# Patient Record
Sex: MALE | Race: WHITE | Employment: OTHER | URBAN - METROPOLITAN AREA
[De-identification: names, ages, dates, MRNs, and addresses within clinical notes are randomized per-mention and may not be internally consistent; named-entity substitution may affect disease eponyms.]

---

## 2018-04-11 ENCOUNTER — HOSPITAL ENCOUNTER (OUTPATIENT)
Age: 58
Discharge: HOME | End: 2018-04-11
Payer: COMMERCIAL

## 2018-04-11 PROCEDURE — 84681 ASSAY OF C-PEPTIDE: CPT

## 2018-04-11 PROCEDURE — 83516 IMMUNOASSAY NONANTIBODY: CPT

## 2018-04-11 PROCEDURE — 86337 INSULIN ANTIBODIES: CPT

## 2018-04-14 LAB — C-PEPTIDE: 0.6 NG/ML (ref 0.8–3.5)

## 2018-04-15 LAB — GLUTAMIC ACID DECARB AB: >250 IU/ML (ref 0–5)

## 2018-04-17 LAB — INSULIN A: <0.4 U/ML (ref 0–0.4)

## 2018-05-04 ENCOUNTER — OFFICE VISIT (OUTPATIENT)
Dept: ENDOCRINOLOGY | Age: 58
End: 2018-05-04
Payer: COMMERCIAL

## 2018-05-04 VITALS
DIASTOLIC BLOOD PRESSURE: 76 MMHG | SYSTOLIC BLOOD PRESSURE: 146 MMHG | HEART RATE: 75 BPM | WEIGHT: 252.2 LBS | OXYGEN SATURATION: 100 % | BODY MASS INDEX: 36.11 KG/M2 | HEIGHT: 70 IN | TEMPERATURE: 97.7 F

## 2018-05-04 DIAGNOSIS — E10.9 TYPE 1 DIABETES MELLITUS WITHOUT COMPLICATION (HCC): Primary | ICD-10-CM

## 2018-05-04 PROCEDURE — 99214 OFFICE O/P EST MOD 30 MIN: CPT | Performed by: INTERNAL MEDICINE

## 2018-05-04 RX ORDER — LANCETS
EACH MISCELLANEOUS
COMMUNITY

## 2018-05-04 RX ORDER — BLOOD SUGAR DIAGNOSTIC
STRIP MISCELLANEOUS
Refills: 10 | COMMUNITY
Start: 2018-04-27 | End: 2019-01-30 | Stop reason: SDUPTHER

## 2018-05-04 RX ORDER — PEN NEEDLE, DIABETIC 32GX 5/32"
NEEDLE, DISPOSABLE MISCELLANEOUS
Refills: 3 | COMMUNITY
Start: 2018-04-21 | End: 2019-01-30 | Stop reason: SDUPTHER

## 2018-07-24 ENCOUNTER — OFFICE VISIT (OUTPATIENT)
Dept: ENDOCRINOLOGY | Age: 58
End: 2018-07-24
Payer: COMMERCIAL

## 2018-07-24 VITALS
DIASTOLIC BLOOD PRESSURE: 78 MMHG | SYSTOLIC BLOOD PRESSURE: 124 MMHG | BODY MASS INDEX: 34.25 KG/M2 | TEMPERATURE: 98.7 F | OXYGEN SATURATION: 99 % | HEIGHT: 70 IN | RESPIRATION RATE: 16 BRPM | WEIGHT: 239.25 LBS | HEART RATE: 82 BPM

## 2018-07-24 DIAGNOSIS — E10.9 TYPE 1 DIABETES MELLITUS WITHOUT COMPLICATION (HCC): Primary | ICD-10-CM

## 2018-07-24 LAB — HBA1C MFR BLD: 7 %

## 2018-07-24 PROCEDURE — 83036 HEMOGLOBIN GLYCOSYLATED A1C: CPT | Performed by: INTERNAL MEDICINE

## 2018-07-24 PROCEDURE — 99214 OFFICE O/P EST MOD 30 MIN: CPT | Performed by: INTERNAL MEDICINE

## 2018-07-24 RX ORDER — MELATONIN 10 MG
CAPSULE ORAL
COMMUNITY

## 2018-07-24 NOTE — PROGRESS NOTES
into the skin daily 48 UNITS - 52 UNITS       KRILL OIL PO Take 600 mg by mouth      TURMERIC PO Take 1,800 mg by mouth daily      APPLE CIDER VINEGAR PO Take by mouth daily      Boswellia Shaheen (BOSWELLIA PO) Take 480 mg by mouth daily      DEVILS CLAW PO Take 480 mg by mouth daily      MELATONIN PO Take 500 mcg by mouth daily 10 mg      Pramoxine-HC (HYDROCORTISONE-PRAMOXINE) 2.5-1 % CREA Apply topically      glimepiride (AMARYL) 4 MG tablet Take 1 tablet by mouth every morning 30 tablet 3    oxyCODONE-acetaminophen (PERCOCET) 5-325 MG per tablet Take 1 tablet by mouth every 8 hours as needed for Pain 12 tablet 0    sennosides-docusate sodium (SENOKOT-S) 8.6-50 MG tablet Take 1 tablet by mouth daily. 20 tablet 0    levothyroxine (SYNTHROID) 125 MCG tablet Take 125 mcg by mouth daily.  multivitamin (THERAGRAN) per tablet Take 1 tablet by mouth daily.  Ascorbic Acid (VITAMIN C) 1000 MG tablet Take 1,000 mg by mouth daily.  Cholecalciferol (VITAMIN D-3) 5000 UNITS TABS Take  by mouth.  Coenzyme Q10 (COQ-10) 400 MG CAPS Take  by mouth.  Psyllium (METAMUCIL PO) Take  by mouth.  atorvastatin (LIPITOR) 20 MG tablet Take 20 mg by mouth daily.  metFORMIN (GLUCOPHAGE) 500 MG tablet TAKE 2 TABLETS BY MOUTH TWICE DAILY WITH MEALS 120 tablet 0    Magnesium 500 MG TABS Take 400 mg by mouth daily       No current facility-administered medications on file prior to visit. ROS - Card: no CP,  Pulm: no SOB,  GI: + exacerbation of his anal fistula    PE -  Gen : /78 (Site: Right Arm, Position: Sitting, Cuff Size: Medium Adult)   Pulse 82   Temp 98.7 °F (37.1 °C) (Oral)   Resp 16   Ht 5' 10\" (1.778 m)   Wt 239 lb 4 oz (108.5 kg)   SpO2 99%   BMI 34.33 kg/m²    WN, WD, NAD  Lung: Nml resp effort  Psych: Normal mood   Full affect  Neur: Moves all extremities well      A/P -   1.  T1DM - controlled   A1c - 7.0% 7/24/18   Target a1c - 7.0-7.2%   Conts to follow his insulin regimen carefully and adhere to a diabetic diet   Will cont with the same plan:   Glargine 36 units and metformin 1000 mg bid   Eat 15 grams of carbs with 5 grams of protein at night on the days that you play golf or walk 2 miles   During the period of fistula inflammation/infection, average the fasting blood glucose over a three day period. If it is greater than 180, increase glargine by 2 units. Decrease back to 36 units daily once the infection has resolved   RTC in 3 months       2.   HTN - excellent   Cont with present anti-hypertensive regimen    Merlin Amasa  7/24/18

## 2018-09-19 ENCOUNTER — HOSPITAL ENCOUNTER (OUTPATIENT)
Age: 58
Discharge: HOME | End: 2018-09-21
Payer: COMMERCIAL

## 2018-09-19 PROCEDURE — 87088 URINE BACTERIA CULTURE: CPT

## 2018-09-19 PROCEDURE — 88112 CYTOPATH CELL ENHANCE TECH: CPT

## 2018-09-19 PROCEDURE — 87186 SC STD MICRODIL/AGAR DIL: CPT

## 2018-09-21 LAB
ORGANISM: ABNORMAL
URINE CULTURE, ROUTINE: ABNORMAL
URINE CULTURE, ROUTINE: ABNORMAL

## 2018-10-25 ENCOUNTER — OFFICE VISIT (OUTPATIENT)
Dept: ENDOCRINOLOGY | Age: 58
End: 2018-10-25
Payer: COMMERCIAL

## 2018-10-25 VITALS
OXYGEN SATURATION: 99 % | DIASTOLIC BLOOD PRESSURE: 90 MMHG | TEMPERATURE: 98.1 F | WEIGHT: 235 LBS | HEIGHT: 70 IN | HEART RATE: 75 BPM | BODY MASS INDEX: 33.64 KG/M2 | SYSTOLIC BLOOD PRESSURE: 150 MMHG

## 2018-10-25 DIAGNOSIS — E10.9 TYPE 1 DIABETES MELLITUS WITHOUT COMPLICATION (HCC): Primary | ICD-10-CM

## 2018-10-25 LAB — HBA1C MFR BLD: 7.4 %

## 2018-10-25 PROCEDURE — 99214 OFFICE O/P EST MOD 30 MIN: CPT | Performed by: INTERNAL MEDICINE

## 2018-10-25 PROCEDURE — 83036 HEMOGLOBIN GLYCOSYLATED A1C: CPT | Performed by: INTERNAL MEDICINE

## 2018-12-08 ENCOUNTER — TELEPHONE (OUTPATIENT)
Dept: ENDOCRINOLOGY | Age: 58
End: 2018-12-08

## 2018-12-08 DIAGNOSIS — Z79.4 TYPE 2 DIABETES MELLITUS WITH DIABETIC CATARACT, WITH LONG-TERM CURRENT USE OF INSULIN (HCC): Primary | ICD-10-CM

## 2018-12-08 DIAGNOSIS — E11.36 TYPE 2 DIABETES MELLITUS WITH DIABETIC CATARACT, WITH LONG-TERM CURRENT USE OF INSULIN (HCC): Primary | ICD-10-CM

## 2019-01-30 ENCOUNTER — OFFICE VISIT (OUTPATIENT)
Dept: ENDOCRINOLOGY | Age: 59
End: 2019-01-30
Payer: COMMERCIAL

## 2019-01-30 VITALS
WEIGHT: 233.6 LBS | HEART RATE: 66 BPM | HEIGHT: 70 IN | SYSTOLIC BLOOD PRESSURE: 136 MMHG | RESPIRATION RATE: 16 BRPM | DIASTOLIC BLOOD PRESSURE: 76 MMHG | OXYGEN SATURATION: 99 % | TEMPERATURE: 97.6 F | BODY MASS INDEX: 33.44 KG/M2

## 2019-01-30 DIAGNOSIS — I10 ESSENTIAL HYPERTENSION: ICD-10-CM

## 2019-01-30 DIAGNOSIS — E10.319 TYPE 1 DIABETES MELLITUS WITH RETINOPATHY, MACULAR EDEMA PRESENCE UNSPECIFIED, UNSPECIFIED LATERALITY, UNSPECIFIED RETINOPATHY SEVERITY (HCC): Primary | ICD-10-CM

## 2019-01-30 LAB — HBA1C MFR BLD: 7.8 %

## 2019-01-30 PROCEDURE — 99214 OFFICE O/P EST MOD 30 MIN: CPT | Performed by: INTERNAL MEDICINE

## 2019-01-30 PROCEDURE — 83036 HEMOGLOBIN GLYCOSYLATED A1C: CPT | Performed by: INTERNAL MEDICINE

## 2019-01-30 RX ORDER — BLOOD SUGAR DIAGNOSTIC
STRIP MISCELLANEOUS
Qty: 200 EACH | Refills: 3 | Status: SHIPPED
Start: 2019-01-30 | End: 2021-01-26 | Stop reason: SDUPTHER

## 2019-01-30 RX ORDER — VALSARTAN AND HYDROCHLOROTHIAZIDE 320; 25 MG/1; MG/1
1 TABLET, FILM COATED ORAL DAILY
Qty: 90 TABLET | Refills: 2 | Status: SHIPPED | OUTPATIENT
Start: 2019-01-30

## 2019-01-30 RX ORDER — OXYCODONE AND ACETAMINOPHEN 10; 325 MG/1; MG/1
1 TABLET ORAL EVERY 4 HOURS PRN
COMMUNITY

## 2019-01-30 RX ORDER — CA/D3/MAG OX/ZINC/COP/MANG/BOR 600 MG-800
TABLET,CHEWABLE ORAL
COMMUNITY

## 2019-01-30 RX ORDER — PEN NEEDLE, DIABETIC 32GX 5/32"
NEEDLE, DISPOSABLE MISCELLANEOUS
Qty: 100 EACH | Refills: 3 | Status: SHIPPED | OUTPATIENT
Start: 2019-01-30 | End: 2019-05-30 | Stop reason: SDUPTHER

## 2019-05-30 ENCOUNTER — OFFICE VISIT (OUTPATIENT)
Dept: ENDOCRINOLOGY | Age: 59
End: 2019-05-30
Payer: COMMERCIAL

## 2019-05-30 VITALS
HEIGHT: 70 IN | HEART RATE: 79 BPM | WEIGHT: 231.2 LBS | DIASTOLIC BLOOD PRESSURE: 80 MMHG | SYSTOLIC BLOOD PRESSURE: 130 MMHG | OXYGEN SATURATION: 99 % | BODY MASS INDEX: 33.1 KG/M2

## 2019-05-30 DIAGNOSIS — E03.8 HYPOTHYROIDISM DUE TO HASHIMOTO'S THYROIDITIS: ICD-10-CM

## 2019-05-30 DIAGNOSIS — I10 ESSENTIAL HYPERTENSION: ICD-10-CM

## 2019-05-30 DIAGNOSIS — E06.3 HYPOTHYROIDISM DUE TO HASHIMOTO'S THYROIDITIS: ICD-10-CM

## 2019-05-30 DIAGNOSIS — E10.319 TYPE 1 DIABETES MELLITUS WITH RETINOPATHY, MACULAR EDEMA PRESENCE UNSPECIFIED, UNSPECIFIED LATERALITY, UNSPECIFIED RETINOPATHY SEVERITY (HCC): Primary | ICD-10-CM

## 2019-05-30 LAB — HBA1C MFR BLD: 7.7 %

## 2019-05-30 PROCEDURE — 83036 HEMOGLOBIN GLYCOSYLATED A1C: CPT | Performed by: INTERNAL MEDICINE

## 2019-05-30 PROCEDURE — 99213 OFFICE O/P EST LOW 20 MIN: CPT | Performed by: INTERNAL MEDICINE

## 2019-05-30 RX ORDER — LIOTHYRONINE SODIUM 5 UG/1
TABLET ORAL
Qty: 180 TABLET | Refills: 1 | Status: SHIPPED | OUTPATIENT
Start: 2019-05-30 | End: 2019-11-15 | Stop reason: SDUPTHER

## 2019-05-30 RX ORDER — LEVOTHYROXINE SODIUM 125 MCG
125 TABLET ORAL DAILY
Qty: 90 TABLET | Refills: 3 | Status: SHIPPED
Start: 2019-05-30 | End: 2020-03-17 | Stop reason: SDUPTHER

## 2019-05-30 RX ORDER — LEVOTHYROXINE SODIUM 0.12 MG/1
125 TABLET ORAL DAILY
Qty: 90 TABLET | Refills: 3 | Status: SHIPPED | OUTPATIENT
Start: 2019-05-30 | End: 2019-05-30 | Stop reason: CLARIF

## 2019-05-30 NOTE — PROGRESS NOTES
CC - T2DM    HPI -       Pt returns for follow up of last visit with me on 1/30/2019. Instructions from last visit were:  1. T1DM - controlled   A1c -7.8% 1/30/19; (was 7.4% 10/25/18)   Target a1c - 7.0-7.2%   The rise in his a1c is most likely due to his having a URI 12/23/18 - 1/13/19 that eventually required an antibiotic   Conts to follow his insulin regimen carefully and adhere to a diabetic diet   Will cont with the same plan:   Glargine 32 units and metformin 1000 mg bid   Eat 15 grams of carbs with 5 grams of protein at night on the days when playing golf or walking 2 miles   See me back in four months     2. HTN - Controlled   Cont with present anti-hypertensive regimen. Update from today's encounter:  Weight  Date  235.0 lbs 10/25/18  233.6 lbs 01/30/19  231.2 lbs 05/30/19    A1c 7.8% 1/30/19 (was 7.4% 10/25/19)    BG log over the past two months reviewed. Readings fasting and pre-dinner are in the mid-100's for the most part. He does have some readings in the low 200's (maybe 8 out 30 days). Elevated pre-dinner readings are sometimes due to missing a morning insulin dose and taking it later in the day. Lows are infreq (<1 reading per month <60)  There is no pattern of BG readings. He is seeing a surgeon in [place] to address his fistula and rectal bleeding  Taking Glargine 30 units daily and metformin 1000 mg bid (he reduced his dose of glargine b/c of lows)    PFSH - no change    Meds -  Current Outpatient Medications on File Prior to Visit   Medication Sig Dispense Refill    oxyCODONE-acetaminophen (PERCOCET)  MG per tablet Take 1 tablet by mouth every 4 hours as needed for Pain. Iliana Roach Probiotic Product (PROBIOTIC ADVANCED) CAPS Take by mouth      metFORMIN (GLUCOPHAGE) 500 MG tablet TAKE 2 TABLETS BY MOUTH TWICE DAILY WITH MEALS 360 tablet 2    ACCU-CHEK MANDI PLUS strip Check BG up to six times daily 200 each 3    BD PEN NEEDLE LEONARDO U/F 32G X 4 MM MISC Use to inject 7. 0-7.2%   Glycemic control is adequate. His high readings are likely related to occasional mis-timing of his morning insulin doses. Therefore no adjustment is needed   Will cont with the same plan:   Glargine 30 units daily and metformin 1000 mg twice daily   Eat 15 grams of carbs with 5 grams of protein at night on the days when playing golf or walking 2 miles   Check Urine Albumin Creatinine Ratio    2. Hypothyroidism - status unknown   Check TSH    Cont with present thyroid hormone replacement therapy:  125mcg Synthroid daily and 5 mcg Liothyronine twice daily (was taking it as 10mcg once daily, but I prefer that he divide it into two doses)     3. HTN - Controlled   Cont with present anti-hypertensive regimen.      4.  Follow up    See me back in four months    Vannesa Brush MD  Endocrinology/Obesity  5/30/19

## 2019-05-30 NOTE — PATIENT INSTRUCTIONS
Glargine 30 units daily and metformin 1000 mg twice daily   Eat 15 grams of carbs with 5 grams of protein at night on the days when playing golf or walking 2 miles   Have a TSH and a Urine microalbumin checked   See me back in four months

## 2019-11-15 ENCOUNTER — OFFICE VISIT (OUTPATIENT)
Dept: ENDOCRINOLOGY | Age: 59
End: 2019-11-15
Payer: COMMERCIAL

## 2019-11-15 VITALS
OXYGEN SATURATION: 96 % | WEIGHT: 226.6 LBS | HEART RATE: 74 BPM | DIASTOLIC BLOOD PRESSURE: 86 MMHG | BODY MASS INDEX: 32.44 KG/M2 | SYSTOLIC BLOOD PRESSURE: 142 MMHG | RESPIRATION RATE: 16 BRPM | HEIGHT: 70 IN

## 2019-11-15 DIAGNOSIS — E03.9 HYPOTHYROIDISM (ACQUIRED): ICD-10-CM

## 2019-11-15 DIAGNOSIS — E10.319 TYPE 1 DIABETES MELLITUS WITH RETINOPATHY, MACULAR EDEMA PRESENCE UNSPECIFIED, UNSPECIFIED LATERALITY, UNSPECIFIED RETINOPATHY SEVERITY (HCC): Primary | ICD-10-CM

## 2019-11-15 LAB — HBA1C MFR BLD: 7 %

## 2019-11-15 PROCEDURE — 99213 OFFICE O/P EST LOW 20 MIN: CPT | Performed by: INTERNAL MEDICINE

## 2019-11-15 PROCEDURE — 83036 HEMOGLOBIN GLYCOSYLATED A1C: CPT | Performed by: INTERNAL MEDICINE

## 2019-11-15 RX ORDER — LIOTHYRONINE SODIUM 5 UG/1
TABLET ORAL
Qty: 180 TABLET | Refills: 2 | Status: SHIPPED
Start: 2019-11-15 | End: 2020-03-17 | Stop reason: SDUPTHER

## 2020-03-14 NOTE — PROGRESS NOTES
CC -   T1DM, Hypothyroidism, Graves' disease, Obesity    Background -   Pt returns for follow up of last visit with me on 11/15/2019    T1DM : Diagnosed 2000  (4/11/18 DANTE ab >250, C-peptide 0.6; however, he is controlled without prandial insulin)   First visit: 3/6/18  A1c: 7.7% 3/17/20, 7.0% 11/15/19, 7.0% 5/30/19,  7.8% 1/30/19, 7.4% 10/25/18  BG: fingersticks 2x/day, Both FBG and pre-D  mg/dl, 60% <150 mg/dl, Pre-D   Hypoglycemia: 1x/month, (45 mg/dl)  Regimen: Glargine 30 units daily, metformin 0576 mg bid  Complications: none  Eyes:      02/2020, no DR  Kidneys:  01/27/20 BUN/Cr 17/1.16, GFR >60  BP:          + HTN, 160/88, typically controlled with valsartan-HCTZ  Feet:     3/17/20Good monofilament sensation b/l (except slightly decreased over the left great toe due to a prior injury) no red spots, ulcers or calluses  Statin:    none     Ace-I:    Valsartan  DM Ed:   none  Diet:        Carb restricted, does not count    Graves' Disease: Diagnosed 1996  Treated successfully with MODI thyroid ablation, Takes LT4 125 mcg daily and T3 5mcg daily (states that he had a lot of fatigue until placed on the T3)    01/27/20  TSH 2.28 on LT4 125 mcg daily and Liothyronine 5 mcg bid  ______________________    [facility] -  Medical problems: T2DM, HTN, HLD, Graves' disease, Hypothyroidism, Anal fistulas, Back pain  Medications: Glargine, metformin, LT4, T3, Valsartan-HCTZ, Percocet,    ROS -  Card - no CP  GI - no N/V/D    PE -  Gen : BP (!) 160/88 (Site: Left Upper Arm, Position: Sitting, Cuff Size: Medium Adult)   Temp 97.9 °F (36.6 °C) (Oral)    WN, WD, NAD  Lung: Nml resp effort  Psych: Normal mood   Full affect  Neuro:  Moves all ext well  ______________________      HPI & A/P -   Problem 1  - T2DM  HPI   - Duration 20 yrs, Intensity is severe (insulin), Aggravated by high carb foods, Management is in the context of non-healing anal fistulas    See background above for summary of care    Surveillance and Preventive Care: up to date    Overall, he is doing a good job with his glycemic control  Assessment  - Controlled; cont with the current regimen  Plan   - Glargine 28 units daily and metformin 1000 mg twice daily   Eat 15 grams of carbs with 5 grams of protein at night on the days when playing golf or walking 2 miles   Get results of U ACR    Problem 2 - Hypothyroidism, post-ablative (Graves')  HPI  - No symptoms or hyper or hypothyroidism (no D/C, no palpitations, no sweating, no insomnia, no temp intolerance, no tremors)  Assessment - controlled, no change in his LT4 and T3 replacement therapy  Plan  - Cont with present thyroid hormone replacement therapy:  125mcg Synthroid daily and 5 mcg Liothyronine twice daily     Follow up  Return to see me in 4 months    I spent 25 minutes in a face to face encounter with Rachel Alejandre today. >15 minutes of this was in education and counseling regarding diabetes management, insulin therapy and thyroid hormone replacement therapy.     Flora Matias MD   [id number] [facility] Endocrinology & Obesity  3/14/20

## 2020-03-17 ENCOUNTER — OFFICE VISIT (OUTPATIENT)
Dept: ENDOCRINOLOGY | Age: 60
End: 2020-03-17
Payer: COMMERCIAL

## 2020-03-17 VITALS — TEMPERATURE: 97.9 F | DIASTOLIC BLOOD PRESSURE: 88 MMHG | SYSTOLIC BLOOD PRESSURE: 160 MMHG

## 2020-03-17 LAB — HBA1C MFR BLD: 7.7 %

## 2020-03-17 PROCEDURE — 3051F HG A1C>EQUAL 7.0%<8.0%: CPT | Performed by: INTERNAL MEDICINE

## 2020-03-17 PROCEDURE — 99214 OFFICE O/P EST MOD 30 MIN: CPT | Performed by: INTERNAL MEDICINE

## 2020-03-17 PROCEDURE — 83036 HEMOGLOBIN GLYCOSYLATED A1C: CPT | Performed by: INTERNAL MEDICINE

## 2020-03-17 RX ORDER — LEVOTHYROXINE SODIUM 125 MCG
125 TABLET ORAL DAILY
Qty: 90 TABLET | Refills: 3 | Status: SHIPPED
Start: 2020-03-17 | End: 2020-11-25 | Stop reason: ALTCHOICE

## 2020-03-17 RX ORDER — LIOTHYRONINE SODIUM 5 UG/1
TABLET ORAL
Qty: 180 TABLET | Refills: 3 | Status: SHIPPED
Start: 2020-03-17 | End: 2021-01-26 | Stop reason: DRUGHIGH

## 2020-07-21 ENCOUNTER — OFFICE VISIT (OUTPATIENT)
Dept: ENDOCRINOLOGY | Age: 60
End: 2020-07-21
Payer: COMMERCIAL

## 2020-07-21 LAB — HBA1C MFR BLD: 7.5 %

## 2020-07-21 PROCEDURE — 3051F HG A1C>EQUAL 7.0%<8.0%: CPT | Performed by: INTERNAL MEDICINE

## 2020-07-21 PROCEDURE — 99214 OFFICE O/P EST MOD 30 MIN: CPT | Performed by: INTERNAL MEDICINE

## 2020-07-21 PROCEDURE — 83036 HEMOGLOBIN GLYCOSYLATED A1C: CPT | Performed by: INTERNAL MEDICINE

## 2020-07-21 NOTE — PATIENT INSTRUCTIONS
Glargine 30 units daily and metformin 1000 mg twice daily   Eat 15 grams of carbs with 5 grams of protein at night on the days when playing golf or walking 2 miles. Take similar precautions when eating fish.    Cont with present thyroid hormone replacement therapy:  125mcg Synthroid daily and 5 mcg Liothyronine twice daily      See me back in six months

## 2020-07-21 NOTE — PROGRESS NOTES
CC -   T1DM, Hypothyroidism, Graves' disease, Obesity    Background -   Last visit:  3/17/20  Initial visit: 3/06/18    T1DM : Diagnosed 2000  (4/11/18 DANTE ab >250, C-peptide 0.6; however, he is controlled without prandial insulin)   A1c: 7.5% 7/21/20, 7.7% 3/17/20, 7.0% 11/15/19, 7.0% 5/30/19,  7.8% 1/30/19, 7.4% 10/25/18  BG: fingersticks 2x/day, Both FBG and pre-D low 100's  Hypoglycemia: 1-3x/month, (40,45, others in 60's over past 4 months), good awareness  Regimen: Glargine 30 units daily (he said 28 units was not enough; adjusts based on activity and food choices), metformin 2909 mg bid  Complications: none  Eyes:       02/2020, no DR  Kidneys:  01/27/20 BUN/Cr 17/1.16, GFR >60  BP:          + HTN, typically controlled with valsartan-HCTZ  Feet:     3/17/20 Good monofilament sensation b/l (except slightly decreased over the left great toe due to a prior injury) no red spots, ulcers or calluses  Statin:    none   (statin intolerant)  Ace-I:    Valsartan  DM Ed:   none  Diet:        Carb restricted, does not count    Graves' Disease: Diagnosed 1996  Treated successfully with MODI thyroid ablation, Takes LT4 125 mcg daily and T3 5mcg daily (states that he had a lot of fatigue until placed on the T3)    01/27/20  TSH 2.28 on LT4 125 mcg daily and Liothyronine 5 mcg bid  ______________________    [facility] -  Medical problems: T2DM, HTN, HLD, Graves' disease, Hypothyroidism, Anal fistulas, Back pain  Medications: Glargine, metformin, LT4, T3, Valsartan-HCTZ, Percocet    ROS -  Card - no CP  GI - no N/V/D    PE -  Gen : There were no vitals taken for this visit. WN, WD, NAD  Lung: Nml resp effort  Psych: Normal mood   Full affect  Neuro:  Moves all ext well  ______________________      HPI & A/P -   [id number]  - T2DM  HPI   - Duration 20 yrs, Intensity is severe (insulin), Aggravated by high carb foods, Management is in the context of non-healing anal fistulas    See background above for summary of care    Surveillance and Preventive Care: up to date (get results from UACR)    Having some lows, but they are explainable and avoidable by taking well timed snacks  Assessment  - Controlled; cont with the current regimen  Plan   - Glargine 30 units daily and metformin 1000 mg twice daily   Eat 15 grams of carbs with 5 grams of protein at night on the days when playing golf or walking 2 miles   Get results of U ACR    Problem 2 - Hypoglycemia  HPI  - 1-3x/month, (40,45, others in 60's over past 4 months), good awareness       States he knows what situations lead to the lows: increased activity, decreased food intake, and, oddly, eating fish       No need to adjust daily regimen for sparse number of events that can be prevented through snacking when at increased risk  Assessment - Uncontrolled, but stable  Plan  -   Eat 15 grams of carbs with 5 grams of protein at night on the days when playing golf or walking 2 miles. Take similar precautions when eating fish. Problem 3 - Hypothyroidism, post-ablative (Graves')  HPI  - No symptoms or hyper or hypothyroidism (no D/C, no palpitations, no sweating, no insomnia, no temp intolerance, no tremors)  Assessment - controlled, no change in his LT4 and T3 replacement therapy  Plan  -   Cont with present thyroid hormone replacement therapy:  125mcg Synthroid daily and 5 mcg Liothyronine twice daily     Follow up  Return to see me in 6 months    I spent 25 minutes in a face to face encounter with Derian Gordillo today. >15 minutes of this was in education and counseling regarding diabetes management, insulin therapy and thyroid hormone replacement therapy.     Gabino Lofton MD   [facility] Endocrinology & Obesity  7/21/20

## 2020-11-25 ENCOUNTER — TELEPHONE (OUTPATIENT)
Dept: ENDOCRINOLOGY | Age: 60
End: 2020-11-25

## 2020-11-25 RX ORDER — LEVOTHYROXINE SODIUM 125 UG/1
125 TABLET ORAL DAILY
Qty: 90 TABLET | Refills: 3 | Status: SHIPPED
Start: 2020-11-25 | End: 2021-05-25 | Stop reason: ALTCHOICE

## 2020-11-25 NOTE — TELEPHONE ENCOUNTER
Spoke with pt by phone  Insurance wont approve Synthroid  He has always taken this since being first prescribed LT4 25 yrs ago  Will switch to Euthyrox 125 mcg daily  SDR  11/25/20

## 2021-01-26 ENCOUNTER — OFFICE VISIT (OUTPATIENT)
Dept: ENDOCRINOLOGY | Age: 61
End: 2021-01-26
Payer: COMMERCIAL

## 2021-01-26 VITALS
OXYGEN SATURATION: 99 % | BODY MASS INDEX: 32.43 KG/M2 | TEMPERATURE: 97.3 F | HEART RATE: 74 BPM | DIASTOLIC BLOOD PRESSURE: 78 MMHG | SYSTOLIC BLOOD PRESSURE: 132 MMHG | WEIGHT: 226 LBS

## 2021-01-26 DIAGNOSIS — E10.319 TYPE 1 DIABETES MELLITUS WITH RETINOPATHY, MACULAR EDEMA PRESENCE UNSPECIFIED, UNSPECIFIED LATERALITY, UNSPECIFIED RETINOPATHY SEVERITY (HCC): Primary | ICD-10-CM

## 2021-01-26 DIAGNOSIS — E03.9 HYPOTHYROIDISM (ACQUIRED): ICD-10-CM

## 2021-01-26 LAB — HBA1C MFR BLD: 7.8 %

## 2021-01-26 PROCEDURE — 83036 HEMOGLOBIN GLYCOSYLATED A1C: CPT | Performed by: INTERNAL MEDICINE

## 2021-01-26 PROCEDURE — 99215 OFFICE O/P EST HI 40 MIN: CPT | Performed by: INTERNAL MEDICINE

## 2021-01-26 PROCEDURE — 3051F HG A1C>EQUAL 7.0%<8.0%: CPT | Performed by: INTERNAL MEDICINE

## 2021-01-26 RX ORDER — BLOOD SUGAR DIAGNOSTIC
STRIP MISCELLANEOUS
Qty: 300 EACH | Refills: 3 | Status: SHIPPED | OUTPATIENT
Start: 2021-01-26

## 2021-01-26 NOTE — PATIENT INSTRUCTIONS
Glargine 30 units daily and metformin 1000 mg twice daily   Eat 15 grams of carbs with 5 grams of protein at night on the days when playing golf or walking 2 miles. Take similar precautions when eating fish.    Cont Euthyrox 125 mcg daily   Stop LT3   Repeat TSH in 6-8 weeks   Follow up with Dr. Jesus Saint about skin lesion   Clarify with ophthalmology about whether you have retinopathy or not     See me back in six months

## 2021-01-26 NOTE — PROGRESS NOTES
CC -   T1DM, Hypothyroidism, Graves' disease    Background -   Last visit:  7/21/20  Initial visit: 3/06/18    · T1DM   Diagnosed 2000  (4/11/18 DANTE ab >250, C-peptide 0.6; however, he is controlled without prandial insulin)   A1c: 7.8% 1/26/21, 7.5% 7/21/20, 7.7% 3/17/20, 7.0% 11/15/19, 7.0% 5/30/19,  7.8% 1/30/19, 7.4% 10/25/18  BG: fingersticks 2x/day, Both FBG and pre-D low 100's with some around 200  Hypoglycemia: 1-3x/month, (lowest 57), good awareness  Regimen: Glargine 30 units daily , metformin 9305 mg bid  Complications: none (possibly DR)  Eyes:       12/09/20, unclear about DR (note says he does in the impression and he doesn't in the exam findings.    Kidneys:  07/31/20 BUN/Cr 25/1.39, GFR >52  UACR:     None in records  BP:          + HTN, 132/78, typically controlled with valsartan-HCTZ  Lipids:    7/31/20 /64/49 TG  Feet:    3/17/20 Good monofilament sensation b/l (except slightly decreased over the left great toe due to a prior injury) no red spots, ulcers or calluses  Statin:    none   (statin intolerant)  Ace-I:    Valsartan  DM Ed:   none  Diet:        Carb restricted, does not count    · Graves' Disease  Diagnosed 1996  Treated successfully with MODI thyroid ablation  Had a lot of fatigue until placed on the T3  Switched from Synthroid to Euthyrox 11/25/20 due to insurance changes    01/27/20  TSH 2.28 on LT4 125 mcg daily and Liothyronine 5 mcg bid                      Reduced his dose of LT3 to 5 mcg daily b/c he didn't \"feel right\"  07/31/20  TSH 1.34 on LT4 125 mcg daily and Liothyronine 5 mcg daily    ______________________    [facility] -  Medical problems: T1DM, HTN, HLD, Graves' disease, Hypothyroidism, Anal fistulas, Back pain  Current Outpatient Medications   Medication Sig Dispense Refill    EUTHYROX 125 MCG tablet Take 1 tablet by mouth Daily 90 tablet 3    metFORMIN (GLUCOPHAGE) 500 MG tablet TAKE 2 TABLETS BY MOUTH TWICE DAILY WITH MEALS 360 tablet 3  insulin glargine (LANTUS;BASAGLAR) 100 UNIT/ML injection pen Inject 30 Units into the skin daily (Patient taking differently: Inject 30 Units into the skin daily 29 units in am) 5 pen 5    liothyronine (CYTOMEL) 5 MCG tablet Taking one tablet daily 180 tablet 3    Insulin Pen Needle 32G X 4 MM MISC 1 each by Does not apply route daily 100 each 3    oxyCODONE-acetaminophen (PERCOCET)  MG per tablet Take 1 tablet by mouth every 4 hours as needed for Pain. Charissa Romero Probiotic Product (PROBIOTIC ADVANCED) CAPS Take by mouth      ACCU-CHEK MANDI PLUS strip Check BG up to six times daily 200 each 3    valsartan-hydrochlorothiazide (DIOVAN-HCT) 320-25 MG per tablet Take 1 tablet by mouth daily 90 tablet 2    Melatonin 10 MG CAPS Take by mouth      glucose blood VI test strips (EXACTECH TEST) strip by Other route      ACCU-CHEK FASTCLIX LANCETS MISC by Other route      bimatoprost (LUMIGAN) 0.01 % SOLN ophthalmic drops 1 Drop daily at bedtime.  KRILL OIL PO Take 600 mg by mouth      TURMERIC PO Take 1,800 mg by mouth daily      APPLE CIDER VINEGAR PO Take by mouth daily      Pramoxine-HC (HYDROCORTISONE-PRAMOXINE) 2.5-1 % CREA Apply topically      multivitamin (THERAGRAN) per tablet Take 1 tablet by mouth daily.  Ascorbic Acid (VITAMIN C) 1000 MG tablet Take 1,000 mg by mouth daily.  Cholecalciferol (VITAMIN D-3) 5000 UNITS TABS Take  by mouth.  Coenzyme Q10 (COQ-10) 400 MG CAPS Take  by mouth.  Psyllium (METAMUCIL PO) Take  by mouth. No current facility-administered medications for this visit. ROS -  Card - no CP  GI - no N/V/D    PE -  Gen : /78   Pulse 74   Temp 97.3 °F (36.3 °C)   Wt 226 lb (102.5 kg)   SpO2 99%   BMI 32.43 kg/m²    WN, WD, NAD  Lung: Nml resp effort  Psych: Normal mood   Full affect  Neuro:  Moves all ext well  ______________________      HPI & A/P -   Problem 1  - T1DM  HPI   - See background above for summary of care Surveillance and Preventive Care: need UACR    Having some lows, but they are explainable and avoidable by taking well timed snacks  Assessment  - Controlled; cont with the current regimen  Plan   - Glargine 30 units daily and metformin 1000 mg twice daily   Eat 15 grams of carbs with 5 grams of protein at night on the days when playing golf or walking 2 miles   Get results of U ACR    Problem 2 - Hypothyroidism, post-ablative (Graves')   HPI  - No symptoms or hyper or hypothyroidism (no D/C, no palpitations, no sweating, no insomnia, no temp intolerance, no tremors)      Switched from Synthroid to Euthyrox on 11/25/20 b/c of insurance change      Reduced his dose of LT3 b/c of not feeling well and plans now on stopping it all together  Assessment - controlled per symptoms; need to repeat TSH after changes to LT3  Plan  -   Cont Euthyrox 125 mcg daily  Stop LT3  Repeat TSH in 6-8 weeks    Problem 3 - skin site at previous insulin injection site  HPI  - Happened on 1/20/21      When giving the injection it didn't feel right to him      On exam, it appears to be an area that is fluctuant. It is discolored reddish/purple in area of fluctuance. No tenderness. No warmth.  No surrounding errythema      May need to have it lanced      Will be seeing PCP tomorrow and address it with him  Assessment - Uncontrolled  Plan  - follow up with PCP      Follow up  Return to see me in 6 months    Total time spent on encounter: 40 min    Jaky Silveira MD   [facility] Endocrinology & Obesity  1/26/21

## 2021-05-25 DIAGNOSIS — E03.9 HYPOTHYROIDISM (ACQUIRED): ICD-10-CM

## 2021-05-25 DIAGNOSIS — E10.319 TYPE 1 DIABETES MELLITUS WITH RETINOPATHY, MACULAR EDEMA PRESENCE UNSPECIFIED, UNSPECIFIED LATERALITY, UNSPECIFIED RETINOPATHY SEVERITY (HCC): Primary | ICD-10-CM

## 2021-05-25 RX ORDER — LEVOTHYROXINE SODIUM 0.12 MG/1
125 TABLET ORAL DAILY
Qty: 90 TABLET | Refills: 3 | Status: SHIPPED | OUTPATIENT
Start: 2021-05-25

## 2021-05-25 RX ORDER — LEVOTHYROXINE SODIUM 0.12 MG/1
125 TABLET ORAL DAILY
Qty: 90 TABLET | Refills: 3 | Status: SHIPPED
Start: 2021-05-25 | End: 2021-05-25

## 2023-09-26 PROBLEM — N99.89: Status: ACTIVE | Noted: 2023-09-26

## 2023-09-26 PROBLEM — K62.89 RECTAL PAIN: Status: ACTIVE | Noted: 2023-09-26

## 2023-09-26 PROBLEM — K60.3 PERIANAL FISTULA: Status: ACTIVE | Noted: 2023-09-26

## 2023-09-26 RX ORDER — PROPRANOLOL/HYDROCHLOROTHIAZID 40 MG-25MG
TABLET ORAL
COMMUNITY
End: 2023-10-17

## 2023-09-26 RX ORDER — MULTIVITAMIN
TABLET ORAL
COMMUNITY

## 2023-09-26 RX ORDER — CHOLECALCIFEROL (VITAMIN D3) 125 MCG
CAPSULE ORAL
COMMUNITY

## 2023-09-26 RX ORDER — METFORMIN HYDROCHLORIDE 500 MG/1
TABLET, EXTENDED RELEASE ORAL
COMMUNITY

## 2023-09-26 RX ORDER — MV/FA/DHA/EPA/FISH OIL/SAW/GNK 400MCG-200
COMBINATION PACKAGE (EA) ORAL
COMMUNITY

## 2023-09-26 RX ORDER — BIMATOPROST 0.1 MG/ML
SOLUTION/ DROPS OPHTHALMIC
COMMUNITY

## 2023-09-26 RX ORDER — VALSARTAN AND HYDROCHLOROTHIAZIDE 320; 25 MG/1; MG/1
TABLET, FILM COATED ORAL
COMMUNITY

## 2023-09-26 RX ORDER — OXYCODONE AND ACETAMINOPHEN 10; 325 MG/1; MG/1
TABLET ORAL
COMMUNITY

## 2023-09-26 RX ORDER — INSULIN GLARGINE 100 [IU]/ML
INJECTION, SOLUTION SUBCUTANEOUS
COMMUNITY

## 2023-09-26 RX ORDER — LEVOTHYROXINE SODIUM 125 UG/1
TABLET ORAL
COMMUNITY

## 2023-09-26 RX ORDER — IBUPROFEN 100 MG/5ML
SUSPENSION, ORAL (FINAL DOSE FORM) ORAL
COMMUNITY

## 2023-09-26 RX ORDER — LIOTHYRONINE SODIUM 5 UG/1
TABLET ORAL
COMMUNITY
End: 2023-10-17

## 2023-09-26 RX ORDER — PSYLLIUM SEED (WITH DEXTROSE)
POWDER (GRAM) ORAL
COMMUNITY

## 2023-10-02 ENCOUNTER — PREP FOR PROCEDURE (OUTPATIENT)
Dept: SURGERY | Facility: HOSPITAL | Age: 63
End: 2023-10-02

## 2023-10-02 ENCOUNTER — OFFICE VISIT (OUTPATIENT)
Dept: SURGERY | Facility: CLINIC | Age: 63
End: 2023-10-02
Payer: COMMERCIAL

## 2023-10-02 VITALS
HEIGHT: 70 IN | HEART RATE: 75 BPM | SYSTOLIC BLOOD PRESSURE: 156 MMHG | BODY MASS INDEX: 33.64 KG/M2 | DIASTOLIC BLOOD PRESSURE: 95 MMHG | WEIGHT: 235 LBS

## 2023-10-02 DIAGNOSIS — K60.3 PERIANAL FISTULA: Primary | ICD-10-CM

## 2023-10-02 DIAGNOSIS — E11.9 TYPE 2 DIABETES MELLITUS WITHOUT COMPLICATION, WITH LONG-TERM CURRENT USE OF INSULIN (MULTI): ICD-10-CM

## 2023-10-02 DIAGNOSIS — K62.89 RECTAL PAIN: ICD-10-CM

## 2023-10-02 DIAGNOSIS — Z79.4 TYPE 2 DIABETES MELLITUS WITHOUT COMPLICATION, WITH LONG-TERM CURRENT USE OF INSULIN (MULTI): ICD-10-CM

## 2023-10-02 DIAGNOSIS — K60.3 ANAL FISTULA: Primary | ICD-10-CM

## 2023-10-02 DIAGNOSIS — K60.3 ANAL FISTULA: ICD-10-CM

## 2023-10-02 DIAGNOSIS — K60.3 PERIANAL FISTULA: ICD-10-CM

## 2023-10-02 PROCEDURE — 46050 I&D PERIANAL ABSCESS SUPFC: CPT | Performed by: SURGERY

## 2023-10-02 PROCEDURE — 3077F SYST BP >= 140 MM HG: CPT | Performed by: SURGERY

## 2023-10-02 PROCEDURE — 3080F DIAST BP >= 90 MM HG: CPT | Performed by: SURGERY

## 2023-10-02 PROCEDURE — 99215 OFFICE O/P EST HI 40 MIN: CPT | Performed by: SURGERY

## 2023-10-02 ASSESSMENT — ENCOUNTER SYMPTOMS
ANAL BLEEDING: 1
RECTAL PAIN: 1
CONSTITUTIONAL NEGATIVE: 1

## 2023-10-02 NOTE — PROGRESS NOTES
Subjective   Patient ID: Sawyer Jamison is a 62 y.o. male who presents with history of perianal fistula to rectum s/p of multiple procedures. Patient states he still has setons in place. He has new opening that is draining (bloody discharge) and this is bothering him the most and what brings him in today.     HPI    62 male with ho prostate cancer sp radical prostatectomy via robotic transabdominal approach. This sounds like it was complicated by a urethral injury and subsequent fistula (records missing as EMR change underway). He started to develop recurrent perianal anal abscesses/fistula treated by [facility] - setons and subsequent advancement flap. He underwent an EUA, Incision and drainage of perianal abscess, Seton Placement x3, Excision of perineal lesion and flexible Cystoscopy and retrograde urethrogram as part of a combined case with Urology on 10/2/19.       Patient states that his urethra was not connected correctly during surgery and bladder was nicked during surgery. Patient states that his surgeon told him these complications were do to his diabetes.       Most recent MRI was in 2019 prior to procedures with Dr. Lacy.       Has done well since 2021, previous patient of Dr. Lacy. Was evaluated by MIGUEL ANGEL Weeks on 8/18/23 and given Augmentin due to abscess.     PSH:  colonoscopy (2010)   seton placement 2015  advancement flap 2015  seton placement 2016     PMH:  DM  History of prostate cancer s/p resection no radiation     Review of Systems   Constitutional: Negative.    Gastrointestinal:  Positive for anal bleeding and rectal pain.       Objective   Physical Exam  Vitals reviewed.   HENT:      Head: Normocephalic.   Eyes:      Extraocular Movements: Extraocular movements intact.   Cardiovascular:      Pulses: Normal pulses.   Pulmonary:      Effort: Pulmonary effort is normal.   Abdominal:      General: Abdomen is flat.      Palpations: Abdomen is soft.   Genitourinary:     Comments: Severe perianal  induration, setons in place, right gluteal abscess (I&D performed)  Musculoskeletal:      Cervical back: Normal range of motion.   Neurological:      General: No focal deficit present.      Mental Status: He is alert.   Psychiatric:         Mood and Affect: Mood normal.       Procedure Note: External exam revealed bilateral external openings. 2 setons were in place. On the right gluteal area there was a fluctuant area. With a lubricated, gloved index finger, I gently performed a 360 degree sweep of the rectum checking for anal sphincter tone, tenderness, nodules, and masses. Following gentle dilation with a digital rectal exam, I inserted the lubricated anoscope with the obturator completely inserted. The obturator was removed after fully inserting the anoscope. The anoscope was slowly withdrawn, and the rectal and anal mucosa examined over 360 degrees. The setons were in place at the internal openings with no additional abnormalities noted.    Procedure Note: Informed consent was obtained. The area was prepped with betadine. 4 mL lidocaine 2% with epinephrine was injected for analgesia. A 11 blade was used over the most fluctuant area. Purulence was expressed. The cavity was gently explored with a hemostat. The area was irrigated with sterile saline. A dressing was applied. Patient tolerated procedure well.     Assessment/Plan        62M with complex perianal fistula following radical prostatectomy previously well controlled with Setons now with new right gluteal abscess.    We discussed the anatomy, pathophysiology and natural history of abscesses and fistulas. We reviewed treatment approaches, indications for and limitations of surgery, procedures, treatment goals and alternatives, recurrence rates, and the need for several, staged procedures in detail. We discussed the use of postoperative medications including their benefits and limitations. We discussed options, preparation, perioperative course, recovery,  healing, risks including but not limited to bleeding, recurrence and incontinence, and possible outcomes in detail.     We will plan for EUA and Seton placement in OR 10/25. We have also ordered a MRI to evaluate. We have referred him to endocrinology today. If he desires further defintive management, we will need to plan with Dr. Weir as this will likely need a complex reconstruction and diversion.     Consent obtained. Preoperative orders placed.

## 2023-10-16 ENCOUNTER — HOSPITAL ENCOUNTER (OUTPATIENT)
Dept: MRI IMAGING | Age: 63
Discharge: HOME | End: 2023-10-18
Attending: FAMILY MEDICINE
Payer: COMMERCIAL

## 2023-10-16 DIAGNOSIS — K60.3 PERIANAL FISTULA: Primary | ICD-10-CM

## 2023-10-16 DIAGNOSIS — K60.3 ANAL FISTULA: ICD-10-CM

## 2023-10-16 PROCEDURE — 6360000004 HC RX CONTRAST MEDICATION: Performed by: RADIOLOGY

## 2023-10-16 PROCEDURE — 72197 MRI PELVIS W/O & W/DYE: CPT

## 2023-10-16 PROCEDURE — A9577 INJ MULTIHANCE: HCPCS | Performed by: RADIOLOGY

## 2023-10-16 RX ADMIN — GADOBENATE DIMEGLUMINE 20 ML: 529 INJECTION, SOLUTION INTRAVENOUS at 11:09

## 2023-10-24 ENCOUNTER — APPOINTMENT (OUTPATIENT)
Dept: CT IMAGING | Age: 63
DRG: 398 | End: 2023-10-24
Payer: COMMERCIAL

## 2023-10-24 ENCOUNTER — HOSPITAL ENCOUNTER (INPATIENT)
Age: 63
LOS: 2 days | Discharge: HOME | DRG: 398 | End: 2023-10-26
Attending: EMERGENCY MEDICINE | Admitting: STUDENT IN AN ORGANIZED HEALTH CARE EDUCATION/TRAINING PROGRAM
Payer: COMMERCIAL

## 2023-10-24 DIAGNOSIS — K35.80 ACUTE APPENDICITIS, UNSPECIFIED ACUTE APPENDICITIS TYPE: ICD-10-CM

## 2023-10-24 DIAGNOSIS — K35.32 ACUTE APPENDICITIS WITH PERFORATION, LOCALIZED PERITONITIS, AND GANGRENE, WITHOUT ABSCESS: ICD-10-CM

## 2023-10-24 DIAGNOSIS — N17.9 ACUTE KIDNEY INJURY (HCC): ICD-10-CM

## 2023-10-24 DIAGNOSIS — R73.09 ELEVATED GLUCOSE: ICD-10-CM

## 2023-10-24 DIAGNOSIS — K35.200 ACUTE APPENDICITIS WITH GENERALIZED PERITONITIS WITHOUT GANGRENE, PERFORATION, OR ABSCESS: Primary | ICD-10-CM

## 2023-10-24 DIAGNOSIS — L98.8 FISTULA: ICD-10-CM

## 2023-10-24 LAB
ALBUMIN SERPL-MCNC: 4.1 G/DL (ref 3.5–5.2)
ALP SERPL-CCNC: 51 U/L (ref 40–129)
ALT SERPL-CCNC: 8 U/L (ref 0–40)
ANION GAP SERPL CALCULATED.3IONS-SCNC: 12 MMOL/L (ref 7–16)
AST SERPL-CCNC: 14 U/L (ref 0–39)
B-OH-BUTYR SERPL-MCNC: 1.21 MMOL/L (ref 0.02–0.27)
BACTERIA URNS QL MICRO: ABNORMAL
BASOPHILS # BLD: 0.02 K/UL (ref 0–0.2)
BASOPHILS NFR BLD: 0 % (ref 0–2)
BILIRUB SERPL-MCNC: 0.7 MG/DL (ref 0–1.2)
BILIRUB UR QL STRIP: ABNORMAL
BUN SERPL-MCNC: 29 MG/DL (ref 6–23)
CALCIUM SERPL-MCNC: 9.9 MG/DL (ref 8.6–10.2)
CHLORIDE SERPL-SCNC: 87 MMOL/L (ref 98–107)
CHP ED QC CHECK: YES
CLARITY UR: CLEAR
CO2 SERPL-SCNC: 25 MMOL/L (ref 22–29)
COLOR UR: YELLOW
CREAT SERPL-MCNC: 1.6 MG/DL (ref 0.7–1.2)
EOSINOPHIL # BLD: 0 K/UL (ref 0.05–0.5)
EOSINOPHILS RELATIVE PERCENT: 0 % (ref 0–6)
ERYTHROCYTE [DISTWIDTH] IN BLOOD BY AUTOMATED COUNT: 12.7 % (ref 11.5–15)
GFR SERPL CREATININE-BSD FRML MDRD: 48 ML/MIN/1.73M2
GLUCOSE BLD-MCNC: 303 MG/DL (ref 74–99)
GLUCOSE BLD-MCNC: 306 MG/DL
GLUCOSE BLD-MCNC: 306 MG/DL (ref 74–99)
GLUCOSE SERPL-MCNC: 310 MG/DL (ref 74–99)
GLUCOSE UR STRIP-MCNC: 500 MG/DL
HCT VFR BLD AUTO: 40.9 % (ref 37–54)
HGB BLD-MCNC: 13.7 G/DL (ref 12.5–16.5)
HGB UR QL STRIP.AUTO: ABNORMAL
IMM GRANULOCYTES # BLD AUTO: 0.2 K/UL (ref 0–0.58)
IMM GRANULOCYTES NFR BLD: 1 % (ref 0–5)
KETONES UR STRIP-MCNC: 15 MG/DL
LACTATE BLDV-SCNC: 2 MMOL/L (ref 0.5–2.2)
LEUKOCYTE ESTERASE UR QL STRIP: NEGATIVE
LIPASE SERPL-CCNC: 51 U/L (ref 13–60)
LYMPHOCYTES NFR BLD: 0.79 K/UL (ref 1.5–4)
LYMPHOCYTES RELATIVE PERCENT: 4 % (ref 20–42)
MCH RBC QN AUTO: 29 PG (ref 26–35)
MCHC RBC AUTO-ENTMCNC: 33.5 G/DL (ref 32–34.5)
MCV RBC AUTO: 86.7 FL (ref 80–99.9)
MONOCYTES NFR BLD: 2.01 K/UL (ref 0.1–0.95)
MONOCYTES NFR BLD: 9 % (ref 2–12)
NEUTROPHILS NFR BLD: 87 % (ref 43–80)
NEUTS SEG NFR BLD: 19.76 K/UL (ref 1.8–7.3)
NITRITE UR QL STRIP: NEGATIVE
PH UR STRIP: 6 [PH] (ref 5–9)
PH VENOUS: 7.38 (ref 7.35–7.45)
PLATELET # BLD AUTO: 316 K/UL (ref 130–450)
PMV BLD AUTO: 11.5 FL (ref 7–12)
POTASSIUM SERPL-SCNC: 5.1 MMOL/L (ref 3.5–5)
PROT SERPL-MCNC: 8 G/DL (ref 6.4–8.3)
PROT UR STRIP-MCNC: NEGATIVE MG/DL
RBC # BLD AUTO: 4.72 M/UL (ref 3.8–5.8)
RBC # BLD: NORMAL 10*6/UL
RBC #/AREA URNS HPF: ABNORMAL /HPF
SODIUM SERPL-SCNC: 124 MMOL/L (ref 132–146)
SP GR UR STRIP: 1.02 (ref 1–1.03)
UROBILINOGEN UR STRIP-ACNC: 0.2 EU/DL (ref 0–1)
WBC #/AREA URNS HPF: ABNORMAL /HPF
WBC OTHER # BLD: 22.8 K/UL (ref 4.5–11.5)

## 2023-10-24 PROCEDURE — 2580000003 HC RX 258: Performed by: PHYSICIAN ASSISTANT

## 2023-10-24 PROCEDURE — 1200000000 HC SEMI PRIVATE

## 2023-10-24 PROCEDURE — 83605 ASSAY OF LACTIC ACID: CPT

## 2023-10-24 PROCEDURE — 81001 URINALYSIS AUTO W/SCOPE: CPT

## 2023-10-24 PROCEDURE — 96375 TX/PRO/DX INJ NEW DRUG ADDON: CPT

## 2023-10-24 PROCEDURE — 74176 CT ABD & PELVIS W/O CONTRAST: CPT

## 2023-10-24 PROCEDURE — 80053 COMPREHEN METABOLIC PANEL: CPT

## 2023-10-24 PROCEDURE — 82800 BLOOD PH: CPT

## 2023-10-24 PROCEDURE — 83690 ASSAY OF LIPASE: CPT

## 2023-10-24 PROCEDURE — 82962 GLUCOSE BLOOD TEST: CPT

## 2023-10-24 PROCEDURE — 85025 COMPLETE CBC W/AUTO DIFF WBC: CPT

## 2023-10-24 PROCEDURE — 99285 EMERGENCY DEPT VISIT HI MDM: CPT

## 2023-10-24 PROCEDURE — 6360000002 HC RX W HCPCS: Performed by: PHYSICIAN ASSISTANT

## 2023-10-24 PROCEDURE — 96374 THER/PROPH/DIAG INJ IV PUSH: CPT

## 2023-10-24 PROCEDURE — 82010 KETONE BODYS QUAN: CPT

## 2023-10-24 RX ORDER — POLYETHYLENE GLYCOL 3350 17 G/17G
17 POWDER, FOR SOLUTION ORAL DAILY PRN
Status: DISCONTINUED | OUTPATIENT
Start: 2023-10-24 | End: 2023-10-26 | Stop reason: HOSPADM

## 2023-10-24 RX ORDER — SODIUM CHLORIDE, SODIUM LACTATE, POTASSIUM CHLORIDE, CALCIUM CHLORIDE 600; 310; 30; 20 MG/100ML; MG/100ML; MG/100ML; MG/100ML
INJECTION, SOLUTION INTRAVENOUS CONTINUOUS
Status: DISCONTINUED | OUTPATIENT
Start: 2023-10-24 | End: 2023-10-26

## 2023-10-24 RX ORDER — 0.9 % SODIUM CHLORIDE 0.9 %
1000 INTRAVENOUS SOLUTION INTRAVENOUS ONCE
Status: COMPLETED | OUTPATIENT
Start: 2023-10-24 | End: 2023-10-25

## 2023-10-24 RX ORDER — SODIUM CHLORIDE 0.9 % (FLUSH) 0.9 %
5-40 SYRINGE (ML) INJECTION PRN
Status: DISCONTINUED | OUTPATIENT
Start: 2023-10-24 | End: 2023-10-26 | Stop reason: HOSPADM

## 2023-10-24 RX ORDER — SODIUM CHLORIDE 0.9 % (FLUSH) 0.9 %
5-40 SYRINGE (ML) INJECTION EVERY 12 HOURS SCHEDULED
Status: DISCONTINUED | OUTPATIENT
Start: 2023-10-24 | End: 2023-10-26 | Stop reason: HOSPADM

## 2023-10-24 RX ORDER — ONDANSETRON 2 MG/ML
4 INJECTION INTRAMUSCULAR; INTRAVENOUS EVERY 6 HOURS PRN
Status: DISCONTINUED | OUTPATIENT
Start: 2023-10-24 | End: 2023-10-25 | Stop reason: SDUPTHER

## 2023-10-24 RX ORDER — ENOXAPARIN SODIUM 100 MG/ML
30 INJECTION SUBCUTANEOUS 2 TIMES DAILY
Status: DISCONTINUED | OUTPATIENT
Start: 2023-10-24 | End: 2023-10-26 | Stop reason: HOSPADM

## 2023-10-24 RX ORDER — FENTANYL CITRATE 50 UG/ML
50 INJECTION, SOLUTION INTRAMUSCULAR; INTRAVENOUS ONCE
Status: COMPLETED | OUTPATIENT
Start: 2023-10-24 | End: 2023-10-24

## 2023-10-24 RX ORDER — MORPHINE SULFATE 2 MG/ML
2 INJECTION, SOLUTION INTRAMUSCULAR; INTRAVENOUS
Status: DISCONTINUED | OUTPATIENT
Start: 2023-10-24 | End: 2023-10-26

## 2023-10-24 RX ORDER — ACETAMINOPHEN 650 MG/1
650 SUPPOSITORY RECTAL EVERY 6 HOURS PRN
Status: DISCONTINUED | OUTPATIENT
Start: 2023-10-24 | End: 2023-10-26 | Stop reason: HOSPADM

## 2023-10-24 RX ORDER — SODIUM CHLORIDE 9 MG/ML
INJECTION, SOLUTION INTRAVENOUS PRN
Status: DISCONTINUED | OUTPATIENT
Start: 2023-10-24 | End: 2023-10-26 | Stop reason: HOSPADM

## 2023-10-24 RX ORDER — OXYCODONE HYDROCHLORIDE 5 MG/1
10 TABLET ORAL EVERY 4 HOURS PRN
Status: DISCONTINUED | OUTPATIENT
Start: 2023-10-24 | End: 2023-10-26 | Stop reason: HOSPADM

## 2023-10-24 RX ORDER — ONDANSETRON 2 MG/ML
4 INJECTION INTRAMUSCULAR; INTRAVENOUS ONCE
Status: COMPLETED | OUTPATIENT
Start: 2023-10-24 | End: 2023-10-24

## 2023-10-24 RX ORDER — METRONIDAZOLE 500 MG/100ML
500 INJECTION, SOLUTION INTRAVENOUS ONCE
Status: DISCONTINUED | OUTPATIENT
Start: 2023-10-24 | End: 2023-10-24

## 2023-10-24 RX ORDER — OXYCODONE HYDROCHLORIDE 5 MG/1
5 TABLET ORAL EVERY 4 HOURS PRN
Status: DISCONTINUED | OUTPATIENT
Start: 2023-10-24 | End: 2023-10-26 | Stop reason: HOSPADM

## 2023-10-24 RX ORDER — MORPHINE SULFATE 4 MG/ML
4 INJECTION, SOLUTION INTRAMUSCULAR; INTRAVENOUS
Status: DISCONTINUED | OUTPATIENT
Start: 2023-10-24 | End: 2023-10-26

## 2023-10-24 RX ORDER — ENOXAPARIN SODIUM 100 MG/ML
30 INJECTION SUBCUTANEOUS DAILY
Status: DISCONTINUED | OUTPATIENT
Start: 2023-10-25 | End: 2023-10-24 | Stop reason: DRUGHIGH

## 2023-10-24 RX ORDER — ACETAMINOPHEN 325 MG/1
650 TABLET ORAL EVERY 6 HOURS PRN
Status: DISCONTINUED | OUTPATIENT
Start: 2023-10-24 | End: 2023-10-26 | Stop reason: HOSPADM

## 2023-10-24 RX ORDER — ONDANSETRON 4 MG/1
4 TABLET, ORALLY DISINTEGRATING ORAL EVERY 8 HOURS PRN
Status: DISCONTINUED | OUTPATIENT
Start: 2023-10-24 | End: 2023-10-25 | Stop reason: SDUPTHER

## 2023-10-24 RX ADMIN — SODIUM CHLORIDE 1000 ML: 9 INJECTION, SOLUTION INTRAVENOUS at 21:44

## 2023-10-24 RX ADMIN — FENTANYL CITRATE 50 MCG: 50 INJECTION, SOLUTION INTRAMUSCULAR; INTRAVENOUS at 21:44

## 2023-10-24 RX ADMIN — SODIUM CHLORIDE 1000 ML: 9 INJECTION, SOLUTION INTRAVENOUS at 20:10

## 2023-10-24 RX ADMIN — ONDANSETRON 4 MG: 2 INJECTION INTRAMUSCULAR; INTRAVENOUS at 20:08

## 2023-10-24 ASSESSMENT — PAIN DESCRIPTION - ORIENTATION
ORIENTATION: RIGHT;MID;LOWER
ORIENTATION: RIGHT

## 2023-10-24 ASSESSMENT — LIFESTYLE VARIABLES
HOW OFTEN DO YOU HAVE A DRINK CONTAINING ALCOHOL: MONTHLY OR LESS
HOW MANY STANDARD DRINKS CONTAINING ALCOHOL DO YOU HAVE ON A TYPICAL DAY: 1 OR 2

## 2023-10-24 ASSESSMENT — PAIN DESCRIPTION - LOCATION
LOCATION: ABDOMEN
LOCATION: ABDOMEN

## 2023-10-24 ASSESSMENT — PAIN SCALES - GENERAL
PAINLEVEL_OUTOF10: 10
PAINLEVEL_OUTOF10: 10

## 2023-10-24 ASSESSMENT — PAIN DESCRIPTION - DESCRIPTORS: DESCRIPTORS: SHARP

## 2023-10-24 NOTE — ED TRIAGE NOTES
Department of Emergency Medicine  FIRST PROVIDER TRIAGE NOTE             Independent MLP           10/24/23  6:23 PM EDT    Date of Encounter: 10/24/23   MRN: [id number]      HPI: Shawn Tai is a 58 y.o. male who presents to the ED for Abdominal Pain (RLQ pain, onset last night) and Emesis   Patient reports difficulty urinating. ROS: Negative for cp, sob, back pain, fever, cough, vomiting, diarrhea, urinary complaints, rash, headache, or dizziness. PE: Gen Appearance/Constitutional: alert  CV: tachycardia  Pulm: CTA bilat  GI: tender to palpation     Initial Plan of Care: All treatment areas with department are currently occupied. Plan to order/Initiate the following while awaiting opening in ED: labs, imaging studies, analgesics, and antiemetics.   Initiate Treatment-Testing, Proceed toTreatment Area When Bed Available for ED Attending/MLP to Continue Care    Electronically signed by ESME Ferrera   DD: 10/24/23

## 2023-10-25 ENCOUNTER — ANESTHESIA EVENT (OUTPATIENT)
Dept: OPERATING ROOM | Age: 63
DRG: 398 | End: 2023-10-25
Payer: COMMERCIAL

## 2023-10-25 ENCOUNTER — ANESTHESIA (OUTPATIENT)
Dept: OPERATING ROOM | Age: 63
DRG: 398 | End: 2023-10-25
Payer: COMMERCIAL

## 2023-10-25 LAB
CREAT UR-MCNC: 119.3 MG/DL (ref 40–278)
GLUCOSE BLD-MCNC: 255 MG/DL (ref 74–99)
GLUCOSE BLD-MCNC: 309 MG/DL (ref 74–99)
GLUCOSE BLD-MCNC: 369 MG/DL (ref 74–99)
GLUCOSE BLD-MCNC: 465 MG/DL (ref 74–99)
SODIUM UR-SCNC: 29 MMOL/L
UUN UR-MCNC: 649 MG/DL (ref 800–1666)

## 2023-10-25 PROCEDURE — 2500000003 HC RX 250 WO HCPCS: Performed by: NURSE ANESTHETIST, CERTIFIED REGISTERED

## 2023-10-25 PROCEDURE — 6360000002 HC RX W HCPCS: Performed by: NURSE ANESTHETIST, CERTIFIED REGISTERED

## 2023-10-25 PROCEDURE — 2580000003 HC RX 258: Performed by: STUDENT IN AN ORGANIZED HEALTH CARE EDUCATION/TRAINING PROGRAM

## 2023-10-25 PROCEDURE — 2500000003 HC RX 250 WO HCPCS: Performed by: STUDENT IN AN ORGANIZED HEALTH CARE EDUCATION/TRAINING PROGRAM

## 2023-10-25 PROCEDURE — 6370000000 HC RX 637 (ALT 250 FOR IP)

## 2023-10-25 PROCEDURE — 6360000002 HC RX W HCPCS

## 2023-10-25 PROCEDURE — 6370000000 HC RX 637 (ALT 250 FOR IP): Performed by: STUDENT IN AN ORGANIZED HEALTH CARE EDUCATION/TRAINING PROGRAM

## 2023-10-25 PROCEDURE — 84540 ASSAY OF URINE/UREA-N: CPT

## 2023-10-25 PROCEDURE — 82570 ASSAY OF URINE CREATININE: CPT

## 2023-10-25 PROCEDURE — 6360000002 HC RX W HCPCS: Performed by: ANESTHESIOLOGY

## 2023-10-25 PROCEDURE — 2709999900 HC NON-CHARGEABLE SUPPLY: Performed by: STUDENT IN AN ORGANIZED HEALTH CARE EDUCATION/TRAINING PROGRAM

## 2023-10-25 PROCEDURE — 2580000003 HC RX 258: Performed by: NURSE ANESTHETIST, CERTIFIED REGISTERED

## 2023-10-25 PROCEDURE — 99223 1ST HOSP IP/OBS HIGH 75: CPT | Performed by: STUDENT IN AN ORGANIZED HEALTH CARE EDUCATION/TRAINING PROGRAM

## 2023-10-25 PROCEDURE — 6360000002 HC RX W HCPCS: Performed by: EMERGENCY MEDICINE

## 2023-10-25 PROCEDURE — 1200000000 HC SEMI PRIVATE

## 2023-10-25 PROCEDURE — 2720000010 HC SURG SUPPLY STERILE: Performed by: STUDENT IN AN ORGANIZED HEALTH CARE EDUCATION/TRAINING PROGRAM

## 2023-10-25 PROCEDURE — 7100000000 HC PACU RECOVERY - FIRST 15 MIN: Performed by: STUDENT IN AN ORGANIZED HEALTH CARE EDUCATION/TRAINING PROGRAM

## 2023-10-25 PROCEDURE — 84300 ASSAY OF URINE SODIUM: CPT

## 2023-10-25 PROCEDURE — 88304 TISSUE EXAM BY PATHOLOGIST: CPT

## 2023-10-25 PROCEDURE — 2580000003 HC RX 258

## 2023-10-25 PROCEDURE — 82962 GLUCOSE BLOOD TEST: CPT

## 2023-10-25 PROCEDURE — 3600000014 HC SURGERY LEVEL 4 ADDTL 15MIN: Performed by: STUDENT IN AN ORGANIZED HEALTH CARE EDUCATION/TRAINING PROGRAM

## 2023-10-25 PROCEDURE — 44970 LAPAROSCOPY APPENDECTOMY: CPT | Performed by: STUDENT IN AN ORGANIZED HEALTH CARE EDUCATION/TRAINING PROGRAM

## 2023-10-25 PROCEDURE — 6360000002 HC RX W HCPCS: Performed by: STUDENT IN AN ORGANIZED HEALTH CARE EDUCATION/TRAINING PROGRAM

## 2023-10-25 PROCEDURE — 3700000001 HC ADD 15 MINUTES (ANESTHESIA): Performed by: STUDENT IN AN ORGANIZED HEALTH CARE EDUCATION/TRAINING PROGRAM

## 2023-10-25 PROCEDURE — 3700000000 HC ANESTHESIA ATTENDED CARE: Performed by: STUDENT IN AN ORGANIZED HEALTH CARE EDUCATION/TRAINING PROGRAM

## 2023-10-25 PROCEDURE — 3600000004 HC SURGERY LEVEL 4 BASE: Performed by: STUDENT IN AN ORGANIZED HEALTH CARE EDUCATION/TRAINING PROGRAM

## 2023-10-25 PROCEDURE — 2580000003 HC RX 258: Performed by: EMERGENCY MEDICINE

## 2023-10-25 PROCEDURE — 6370000000 HC RX 637 (ALT 250 FOR IP): Performed by: PHYSICIAN ASSISTANT

## 2023-10-25 PROCEDURE — 0DTJ4ZZ RESECTION OF APPENDIX, PERCUTANEOUS ENDOSCOPIC APPROACH: ICD-10-PCS | Performed by: STUDENT IN AN ORGANIZED HEALTH CARE EDUCATION/TRAINING PROGRAM

## 2023-10-25 PROCEDURE — 7100000001 HC PACU RECOVERY - ADDTL 15 MIN: Performed by: STUDENT IN AN ORGANIZED HEALTH CARE EDUCATION/TRAINING PROGRAM

## 2023-10-25 RX ORDER — DEXTROSE MONOHYDRATE 100 MG/ML
INJECTION, SOLUTION INTRAVENOUS CONTINUOUS PRN
Status: DISCONTINUED | OUTPATIENT
Start: 2023-10-25 | End: 2023-10-26 | Stop reason: HOSPADM

## 2023-10-25 RX ORDER — INSULIN LISPRO 100 [IU]/ML
5 INJECTION, SOLUTION INTRAVENOUS; SUBCUTANEOUS ONCE
Status: COMPLETED | OUTPATIENT
Start: 2023-10-25 | End: 2023-10-25

## 2023-10-25 RX ORDER — INSULIN GLARGINE 100 [IU]/ML
35 INJECTION, SOLUTION SUBCUTANEOUS DAILY
Status: DISCONTINUED | OUTPATIENT
Start: 2023-10-26 | End: 2023-10-26 | Stop reason: HOSPADM

## 2023-10-25 RX ORDER — LIDOCAINE HYDROCHLORIDE 20 MG/ML
INJECTION, SOLUTION EPIDURAL; INFILTRATION; INTRACAUDAL; PERINEURAL PRN
Status: DISCONTINUED | OUTPATIENT
Start: 2023-10-25 | End: 2023-10-25 | Stop reason: SDUPTHER

## 2023-10-25 RX ORDER — SODIUM CHLORIDE, SODIUM LACTATE, POTASSIUM CHLORIDE, CALCIUM CHLORIDE 600; 310; 30; 20 MG/100ML; MG/100ML; MG/100ML; MG/100ML
INJECTION, SOLUTION INTRAVENOUS CONTINUOUS PRN
Status: DISCONTINUED | OUTPATIENT
Start: 2023-10-25 | End: 2023-10-25 | Stop reason: SDUPTHER

## 2023-10-25 RX ORDER — ROCURONIUM BROMIDE 10 MG/ML
INJECTION, SOLUTION INTRAVENOUS PRN
Status: DISCONTINUED | OUTPATIENT
Start: 2023-10-25 | End: 2023-10-25 | Stop reason: SDUPTHER

## 2023-10-25 RX ORDER — ONDANSETRON 2 MG/ML
INJECTION INTRAMUSCULAR; INTRAVENOUS PRN
Status: DISCONTINUED | OUTPATIENT
Start: 2023-10-25 | End: 2023-10-25 | Stop reason: SDUPTHER

## 2023-10-25 RX ORDER — ENOXAPARIN SODIUM 100 MG/ML
30 INJECTION SUBCUTANEOUS 2 TIMES DAILY
Status: DISCONTINUED | OUTPATIENT
Start: 2023-10-25 | End: 2023-10-25 | Stop reason: SDUPTHER

## 2023-10-25 RX ORDER — BUPIVACAINE HYDROCHLORIDE AND EPINEPHRINE 2.5; 5 MG/ML; UG/ML
INJECTION, SOLUTION EPIDURAL; INFILTRATION; INTRACAUDAL; PERINEURAL PRN
Status: DISCONTINUED | OUTPATIENT
Start: 2023-10-25 | End: 2023-10-25 | Stop reason: ALTCHOICE

## 2023-10-25 RX ORDER — SODIUM CHLORIDE 0.9 % (FLUSH) 0.9 %
5-40 SYRINGE (ML) INJECTION PRN
Status: DISCONTINUED | OUTPATIENT
Start: 2023-10-25 | End: 2023-10-25 | Stop reason: HOSPADM

## 2023-10-25 RX ORDER — INSULIN LISPRO 100 [IU]/ML
0-4 INJECTION, SOLUTION INTRAVENOUS; SUBCUTANEOUS NIGHTLY
Status: DISCONTINUED | OUTPATIENT
Start: 2023-10-25 | End: 2023-10-26 | Stop reason: HOSPADM

## 2023-10-25 RX ORDER — ONDANSETRON 2 MG/ML
4 INJECTION INTRAMUSCULAR; INTRAVENOUS EVERY 6 HOURS PRN
Status: DISCONTINUED | OUTPATIENT
Start: 2023-10-25 | End: 2023-10-26 | Stop reason: HOSPADM

## 2023-10-25 RX ORDER — HYDRALAZINE HYDROCHLORIDE 20 MG/ML
10 INJECTION INTRAMUSCULAR; INTRAVENOUS
Status: DISCONTINUED | OUTPATIENT
Start: 2023-10-25 | End: 2023-10-25 | Stop reason: HOSPADM

## 2023-10-25 RX ORDER — SODIUM CHLORIDE 0.9 % (FLUSH) 0.9 %
5-40 SYRINGE (ML) INJECTION EVERY 12 HOURS SCHEDULED
Status: DISCONTINUED | OUTPATIENT
Start: 2023-10-25 | End: 2023-10-25 | Stop reason: HOSPADM

## 2023-10-25 RX ORDER — ONDANSETRON 4 MG/1
4 TABLET, ORALLY DISINTEGRATING ORAL EVERY 8 HOURS PRN
Status: DISCONTINUED | OUTPATIENT
Start: 2023-10-25 | End: 2023-10-26 | Stop reason: HOSPADM

## 2023-10-25 RX ORDER — MIDAZOLAM HYDROCHLORIDE 2 MG/2ML
2 INJECTION, SOLUTION INTRAMUSCULAR; INTRAVENOUS
Status: DISCONTINUED | OUTPATIENT
Start: 2023-10-25 | End: 2023-10-25 | Stop reason: HOSPADM

## 2023-10-25 RX ORDER — ONDANSETRON 2 MG/ML
4 INJECTION INTRAMUSCULAR; INTRAVENOUS
Status: DISCONTINUED | OUTPATIENT
Start: 2023-10-25 | End: 2023-10-25 | Stop reason: HOSPADM

## 2023-10-25 RX ORDER — INSULIN LISPRO 100 [IU]/ML
0-8 INJECTION, SOLUTION INTRAVENOUS; SUBCUTANEOUS
Status: DISCONTINUED | OUTPATIENT
Start: 2023-10-25 | End: 2023-10-26 | Stop reason: HOSPADM

## 2023-10-25 RX ORDER — OXYCODONE AND ACETAMINOPHEN 10; 325 MG/1; MG/1
1 TABLET ORAL EVERY 4 HOURS PRN
Status: DISCONTINUED | OUTPATIENT
Start: 2023-10-25 | End: 2023-10-26

## 2023-10-25 RX ORDER — POLYETHYLENE GLYCOL 3350 17 G/17G
17 POWDER, FOR SOLUTION ORAL DAILY
Status: DISCONTINUED | OUTPATIENT
Start: 2023-10-25 | End: 2023-10-26

## 2023-10-25 RX ORDER — SODIUM CHLORIDE 9 MG/ML
INJECTION, SOLUTION INTRAVENOUS PRN
Status: DISCONTINUED | OUTPATIENT
Start: 2023-10-25 | End: 2023-10-25 | Stop reason: HOSPADM

## 2023-10-25 RX ORDER — INSULIN GLARGINE 100 [IU]/ML
30 INJECTION, SOLUTION SUBCUTANEOUS DAILY
Status: DISCONTINUED | OUTPATIENT
Start: 2023-10-25 | End: 2023-10-25

## 2023-10-25 RX ORDER — MEPERIDINE HYDROCHLORIDE 25 MG/ML
12.5 INJECTION INTRAMUSCULAR; INTRAVENOUS; SUBCUTANEOUS EVERY 5 MIN PRN
Status: DISCONTINUED | OUTPATIENT
Start: 2023-10-25 | End: 2023-10-25 | Stop reason: HOSPADM

## 2023-10-25 RX ORDER — DIPHENHYDRAMINE HYDROCHLORIDE 50 MG/ML
INJECTION INTRAMUSCULAR; INTRAVENOUS PRN
Status: DISCONTINUED | OUTPATIENT
Start: 2023-10-25 | End: 2023-10-25 | Stop reason: SDUPTHER

## 2023-10-25 RX ORDER — PROPOFOL 10 MG/ML
INJECTION, EMULSION INTRAVENOUS PRN
Status: DISCONTINUED | OUTPATIENT
Start: 2023-10-25 | End: 2023-10-25 | Stop reason: SDUPTHER

## 2023-10-25 RX ORDER — FENTANYL CITRATE 50 UG/ML
INJECTION, SOLUTION INTRAMUSCULAR; INTRAVENOUS PRN
Status: DISCONTINUED | OUTPATIENT
Start: 2023-10-25 | End: 2023-10-25 | Stop reason: SDUPTHER

## 2023-10-25 RX ORDER — LABETALOL HYDROCHLORIDE 5 MG/ML
10 INJECTION, SOLUTION INTRAVENOUS
Status: DISCONTINUED | OUTPATIENT
Start: 2023-10-25 | End: 2023-10-25 | Stop reason: HOSPADM

## 2023-10-25 RX ORDER — IPRATROPIUM BROMIDE AND ALBUTEROL SULFATE 2.5; .5 MG/3ML; MG/3ML
1 SOLUTION RESPIRATORY (INHALATION)
Status: DISCONTINUED | OUTPATIENT
Start: 2023-10-25 | End: 2023-10-25 | Stop reason: HOSPADM

## 2023-10-25 RX ADMIN — PIPERACILLIN SODIUM AND TAZOBACTAM SODIUM 4500 MG: 4; .5 INJECTION, POWDER, LYOPHILIZED, FOR SOLUTION INTRAVENOUS at 00:33

## 2023-10-25 RX ADMIN — OXYCODONE AND ACETAMINOPHEN 1 TABLET: 10; 325 TABLET ORAL at 20:22

## 2023-10-25 RX ADMIN — HYDROMORPHONE HYDROCHLORIDE 0.5 MG: 1 INJECTION, SOLUTION INTRAMUSCULAR; INTRAVENOUS; SUBCUTANEOUS at 08:13

## 2023-10-25 RX ADMIN — OXYCODONE HYDROCHLORIDE 10 MG: 5 TABLET ORAL at 15:04

## 2023-10-25 RX ADMIN — ROCURONIUM BROMIDE 50 MG: 10 INJECTION, SOLUTION INTRAVENOUS at 06:07

## 2023-10-25 RX ADMIN — FENTANYL CITRATE 50 MCG: 50 INJECTION, SOLUTION INTRAMUSCULAR; INTRAVENOUS at 06:59

## 2023-10-25 RX ADMIN — INSULIN LISPRO 8 UNITS: 100 INJECTION, SOLUTION INTRAVENOUS; SUBCUTANEOUS at 16:23

## 2023-10-25 RX ADMIN — SODIUM CHLORIDE, PRESERVATIVE FREE 10 ML: 5 INJECTION INTRAVENOUS at 20:29

## 2023-10-25 RX ADMIN — SODIUM CHLORIDE, PRESERVATIVE FREE 10 ML: 5 INJECTION INTRAVENOUS at 01:51

## 2023-10-25 RX ADMIN — HYDROMORPHONE HYDROCHLORIDE 0.5 MG: 1 INJECTION, SOLUTION INTRAMUSCULAR; INTRAVENOUS; SUBCUTANEOUS at 07:45

## 2023-10-25 RX ADMIN — SUGAMMADEX 204 MG: 100 INJECTION, SOLUTION INTRAVENOUS at 06:48

## 2023-10-25 RX ADMIN — PIPERACILLIN AND TAZOBACTAM 3375 MG: 3; .375 INJECTION, POWDER, LYOPHILIZED, FOR SOLUTION INTRAVENOUS at 15:04

## 2023-10-25 RX ADMIN — SODIUM CHLORIDE, POTASSIUM CHLORIDE, SODIUM LACTATE AND CALCIUM CHLORIDE: 600; 310; 30; 20 INJECTION, SOLUTION INTRAVENOUS at 01:50

## 2023-10-25 RX ADMIN — SODIUM CHLORIDE, POTASSIUM CHLORIDE, SODIUM LACTATE AND CALCIUM CHLORIDE: 600; 310; 30; 20 INJECTION, SOLUTION INTRAVENOUS at 05:45

## 2023-10-25 RX ADMIN — MORPHINE SULFATE 4 MG: 4 INJECTION, SOLUTION INTRAMUSCULAR; INTRAVENOUS at 00:42

## 2023-10-25 RX ADMIN — DIPHENHYDRAMINE HYDROCHLORIDE 12.5 MG: 50 INJECTION, SOLUTION INTRAMUSCULAR; INTRAVENOUS at 06:14

## 2023-10-25 RX ADMIN — INSULIN GLARGINE 30 UNITS: 100 INJECTION, SOLUTION SUBCUTANEOUS at 16:23

## 2023-10-25 RX ADMIN — INSULIN HUMAN 5 UNITS: 100 INJECTION, SOLUTION PARENTERAL at 08:57

## 2023-10-25 RX ADMIN — FENTANYL CITRATE 100 MCG: 50 INJECTION, SOLUTION INTRAMUSCULAR; INTRAVENOUS at 06:07

## 2023-10-25 RX ADMIN — SODIUM CHLORIDE: 9 INJECTION, SOLUTION INTRAVENOUS at 15:03

## 2023-10-25 RX ADMIN — HYDROMORPHONE HYDROCHLORIDE 0.5 MG: 1 INJECTION, SOLUTION INTRAMUSCULAR; INTRAVENOUS; SUBCUTANEOUS at 07:37

## 2023-10-25 RX ADMIN — OXYCODONE HYDROCHLORIDE 10 MG: 5 TABLET ORAL at 08:41

## 2023-10-25 RX ADMIN — INSULIN LISPRO 4 UNITS: 100 INJECTION, SOLUTION INTRAVENOUS; SUBCUTANEOUS at 20:53

## 2023-10-25 RX ADMIN — HYDROMORPHONE HYDROCHLORIDE 0.5 MG: 1 INJECTION, SOLUTION INTRAMUSCULAR; INTRAVENOUS; SUBCUTANEOUS at 07:15

## 2023-10-25 RX ADMIN — ROCURONIUM BROMIDE 10 MG: 10 INJECTION, SOLUTION INTRAVENOUS at 06:33

## 2023-10-25 RX ADMIN — INSULIN LISPRO 5 UNITS: 100 INJECTION, SOLUTION INTRAVENOUS; SUBCUTANEOUS at 20:54

## 2023-10-25 RX ADMIN — POLYETHYLENE GLYCOL 3350 17 G: 17 POWDER, FOR SOLUTION ORAL at 15:04

## 2023-10-25 RX ADMIN — LIDOCAINE HYDROCHLORIDE 60 MG: 20 INJECTION, SOLUTION EPIDURAL; INFILTRATION; INTRACAUDAL; PERINEURAL at 06:07

## 2023-10-25 RX ADMIN — ONDANSETRON 4 MG: 2 INJECTION INTRAMUSCULAR; INTRAVENOUS at 06:07

## 2023-10-25 RX ADMIN — FENTANYL CITRATE 50 MCG: 50 INJECTION, SOLUTION INTRAMUSCULAR; INTRAVENOUS at 06:45

## 2023-10-25 RX ADMIN — SODIUM CHLORIDE, POTASSIUM CHLORIDE, SODIUM LACTATE AND CALCIUM CHLORIDE: 600; 310; 30; 20 INJECTION, SOLUTION INTRAVENOUS at 20:26

## 2023-10-25 RX ADMIN — SODIUM CHLORIDE, POTASSIUM CHLORIDE, SODIUM LACTATE AND CALCIUM CHLORIDE: 600; 310; 30; 20 INJECTION, SOLUTION INTRAVENOUS at 06:44

## 2023-10-25 RX ADMIN — PIPERACILLIN AND TAZOBACTAM 3375 MG: 3; .375 INJECTION, POWDER, LYOPHILIZED, FOR SOLUTION INTRAVENOUS at 23:58

## 2023-10-25 RX ADMIN — PROPOFOL 200 MG: 10 INJECTION, EMULSION INTRAVENOUS at 06:07

## 2023-10-25 RX ADMIN — INSULIN HUMAN 5 UNITS: 100 INJECTION, SOLUTION PARENTERAL at 01:52

## 2023-10-25 RX ADMIN — MORPHINE SULFATE 4 MG: 4 INJECTION, SOLUTION INTRAMUSCULAR; INTRAVENOUS at 04:22

## 2023-10-25 ASSESSMENT — PAIN DESCRIPTION - DESCRIPTORS
DESCRIPTORS: ACHING
DESCRIPTORS: DISCOMFORT;CRAMPING
DESCRIPTORS: DISCOMFORT
DESCRIPTORS: ACHING;SORE;TENDER
DESCRIPTORS: DISCOMFORT

## 2023-10-25 ASSESSMENT — PAIN DESCRIPTION - LOCATION
LOCATION: ABDOMEN

## 2023-10-25 ASSESSMENT — PAIN - FUNCTIONAL ASSESSMENT
PAIN_FUNCTIONAL_ASSESSMENT: ACTIVITIES ARE NOT PREVENTED
PAIN_FUNCTIONAL_ASSESSMENT: 0-10
PAIN_FUNCTIONAL_ASSESSMENT: ACTIVITIES ARE NOT PREVENTED
PAIN_FUNCTIONAL_ASSESSMENT: PREVENTS OR INTERFERES SOME ACTIVE ACTIVITIES AND ADLS

## 2023-10-25 ASSESSMENT — PAIN SCALES - GENERAL
PAINLEVEL_OUTOF10: 7
PAINLEVEL_OUTOF10: 8
PAINLEVEL_OUTOF10: 7
PAINLEVEL_OUTOF10: 8
PAINLEVEL_OUTOF10: 7
PAINLEVEL_OUTOF10: 9
PAINLEVEL_OUTOF10: 8
PAINLEVEL_OUTOF10: 7

## 2023-10-25 ASSESSMENT — PAIN DESCRIPTION - ORIENTATION: ORIENTATION: MID

## 2023-10-25 NOTE — ANESTHESIA PRE PROCEDURE
Department of Anesthesiology  Preprocedure Note       Name:  Dior Neil   Age:  58 y.o.  :  1960                                          MRN:  [id number]         Date:  10/25/2023      Surgeon: Antonio Bentley):  Dolores Li DO    Procedure: Procedure(s):  APPENDECTOMY LAPAROSCOPIC    Medications prior to admission:   Prior to Admission medications    Medication Sig Start Date End Date Taking? Authorizing Provider   levothyroxine (SYNTHROID) 125 MCG tablet Take 1 tablet by mouth Daily 21   Keri Ibarra MD   metFORMIN (GLUCOPHAGE) 500 MG tablet TAKE 2 TABLETS BY MOUTH TWICE DAILY WITH MEALS 21   Keri Ibarra MD   Insulin Pen Needle 32G X 4 MM MISC 1 each by Does not apply route daily 21   Keri Ibarra MD   blood glucose test strips [facility] TEST) strip Check 2-4x daily 21   Keri Ibarra MD   insulin glargine (LANTUS;BASAGLAR) 100 UNIT/ML injection pen Inject 30 Units into the skin daily 21   Keri Ibarra MD   oxyCODONE-acetaminophen (PERCOCET)  MG per tablet Take 1 tablet by mouth every 4 hours as needed for Pain. .    Tobias Marquis MD   Probiotic Product (PROBIOTIC ADVANCED) CAPS Take by mouth    Tobias Marquis MD   valsartan-hydrochlorothiazide (DIOVAN-HCT) 320-25 MG per tablet Take 1 tablet by mouth daily 19   Keri Ibarra MD   Melatonin 10 MG CAPS Take by mouth    Tobias Marquis MD   ACCU-CHEK FASTCLIX LANCETS MISC by Other route    Tobias Marquis MD   bimatoprost (LUMIGAN) 0.01 % SOLN ophthalmic drops 1 Drop daily at bedtime.     Tobias Marquis MD   KRILL OIL PO Take 600 mg by mouth    Tobias Marquis MD   TURMERIC PO Take 1,800 mg by mouth daily    Tobias Marquis MD   APPLE CIDER VINEGAR PO Take by mouth daily    Tobias Marquis MD   Pramoxine-HC (HYDROCORTISONE-PRAMOXINE) 2.5-1 % CREA Apply topically    Tobias Marquis MD   multivitamin (THERAGRAN) per tablet Take 1 tablet by 4.72 10/24/2023 07:56 PM    HGB 13.7 10/24/2023 07:56 PM    HCT 40.9 10/24/2023 07:56 PM    MCV 86.7 10/24/2023 07:56 PM    RDW 12.7 10/24/2023 07:56 PM     10/24/2023 07:56 PM       CMP:   Lab Results   Component Value Date/Time     10/24/2023 08:00 PM    K 5.1 10/24/2023 08:00 PM    CL 87 10/24/2023 08:00 PM    CO2 25 10/24/2023 08:00 PM    BUN 29 10/24/2023 08:00 PM    CREATININE 1.6 10/24/2023 08:00 PM    GFRAA >60 11/04/2015 06:15 PM    LABGLOM 48 10/24/2023 08:00 PM    GLUCOSE 306 10/24/2023 11:14 PM    GLUCOSE 245 12/31/2010 05:40 AM    PROT 8.0 10/24/2023 08:00 PM    CALCIUM 9.9 10/24/2023 08:00 PM    BILITOT 0.7 10/24/2023 08:00 PM    ALKPHOS 51 10/24/2023 08:00 PM    AST 14 10/24/2023 08:00 PM    ALT 8 10/24/2023 08:00 PM       POC Tests:   Recent Labs     10/24/23  2313   POCGLU 306*       Coags:   Lab Results   Component Value Date/Time    PROTIME 11.2 11/04/2015 06:15 PM    PROTIME 11.9 12/29/2010 10:50 AM    INR 1.0 11/04/2015 06:15 PM    APTT 27.6 11/04/2015 06:15 PM       HCG (If Applicable): No results found for: \"PREGTESTUR\", \"PREGSERUM\", \"HCG\", \"HCGQUANT\"     ABGs: No results found for: \"PHART\", \"PO2ART\", \"YQX5SIY\", \"TUC6BND\", \"BEART\", \"M1UBXXVZ\"     Type & Screen (If Applicable):  No results found for: \"LABABO\", \"LABRH\"    Drug/Infectious Status (If Applicable):  No results found for: \"HIV\", \"HEPCAB\"    COVID-19 Screening (If Applicable): No results found for: \"COVID19\"        Anesthesia Evaluation    Airway: Mallampati: II          Dental:          Pulmonary:                             ROS comment: Former smoker, quit 2015   Cardiovascular:    (+) hypertension:,         Rhythm: regular  Rate: normal                    Neuro/Psych:               GI/Hepatic/Renal:   (+) renal disease: CRI,           Endo/Other:    (+) DiabetesType II DM, , hypothyroidism::., .                 Abdominal:             Vascular:           Other Findings:      leukocytosis  Hyponatremia       Anesthesia

## 2023-10-25 NOTE — CONSULTS
[facility]  History and Physical      REASON FOR CONSULTATION:  Medical Management    ATTENDING/ADMITTING PHYSICIAN: Dr. Vallejo Yahir:      The patient is a 58 y.o. male patient of Dr. Tatiana Garcia. Patient presented to the ED with sudden onset of sharp pain in right lower quadrant. Patient states this is never happened in the past and there are no aggravating factors or relieving patient denies any back pain, shortness of breath, fever, chills, diarrhea, constipation patient admits he is a type I diabetic and he is on insulin as well as metformin. Patient does have a history of prostate cancer. 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 He states that he has a perianal fistula that is post to be repaired at [facility] . ER work-up revealed sodium level 124, potassium 5.1, BUN/creatinine 29/1.6, glucose level 310, beta hydroxybutyrate 1.21, anion gap 12, WBC 22.8. Imaging revealed acute appendicitis. General surgery was consulted in the ED patient was started on IV antibiotics and is admitted inpatient to general surgery. We will continue to follow patient. On evaluation he is resting comfortably in bed in no acute distress. He has pain to be expected postop. Blood sugars have been running high in the 300 range, however he has not received his insulin therapy as of yet.     Past Medical History:    Past Medical History:   Diagnosis Date    Cancer ([facility]) 10/2012    prostate    Difficult intubation     Grave's disease 1996    Hyperlipidemia     Hypertension     Hypothyroidism (acquired)     Prostate cancer ([facility]) 11/14/2012    Type 1 diabetes mellitus without complication [facility])        Past Surgical History:    Past Surgical History:   Procedure Laterality Date    ABDOMEN SURGERY  2010    mass    ANUS SURGERY      fistula repair    EYE SURGERY  1998    mega muscle realignment    KNEE ARTHROSCOPY  2000    right    PROSTATECTOMY  11/12/12    lap. robot prostate 10/24/2023 08:00 PM    GLUCOSE 306 10/24/2023 11:14 PM    GLUCOSE 245 12/31/2010 05:40 AM    PROT 8.0 10/24/2023 08:00 PM    LABALBU 4.1 10/24/2023 08:00 PM    LABALBU 3.3 12/31/2010 05:40 AM    CALCIUM 9.9 10/24/2023 08:00 PM    BILITOT 0.7 10/24/2023 08:00 PM    ALKPHOS 51 10/24/2023 08:00 PM    AST 14 10/24/2023 08:00 PM    ALT 8 10/24/2023 08:00 PM       ASSESSMENT:      Principal Problem:    Acute appendicitis  Resolved Problems:    * No resolved hospital problems. *      PLAN:    60-year-old male with a history of prostate Ca is admitted under general surgery with acute appendicitis    CAMI/hyponatremia/mild hyperkalemia  Monitor labs-BUN/creatinine 29/1.6-baseline 26/1.1  IV hydration  Hold nephrotoxins    Diabetes Mellitus, type 1 insulin-dependent  -Resume home insulin dose-she does not follow with endocrinology, generally keeps meticulous track of his blood sugars and able to control them based on diet with insulin dosing  -Insulin sliding scale  -Monitor labs  -ISS glucose control  -Hypoglycemia protocol initiated  -Hold metformin for now    Acute appendicitis  Monitor labs-WBC-22.8  IV Zosyn/Flagyl one-time dose in the ED  Status post lap appendectomy  Antibiotic at discretion of general surgery    Medication for other comorbidities continue as appropriate dose adjustment as necessary.     DVT prophylaxis  Full code    [facility] MD

## 2023-10-25 NOTE — ED PROVIDER NOTES
Shared RUSTAM-ED Attending Visit. CC: No        [facility]  Department of Emergency Medicine   ED  Encounter Note  Admit Date/RoomTime: 10/24/2023  7:41 PM  ED Room:     NAME: Le Vaz  : 1960  MRN: [id number]     Chief Complaint:  Abdominal Pain (RLQ pain, onset last night) and Emesis    History of Present Illness        Le Vaz is a 58 y.o. old male who presents to the emergency department by private vehicle, for sudden onset sharp pain in the RLQ without radiation which began 1 day(s) prior to arrival.  There has been no similar episodes in the past.   Since onset the symptoms have been gradually worsening. The pain is associated with nausea and vomiting. The pain is aggravated by nothing in particular and relieved by nothing. There has been NO back pain, chest pain, shortness of breath, fever, chills, diarrhea, constipation, dark/black stools, blood in stool, urinary frequency, dysuria, hematuria, urinary urgency, urinary incontinence, penile discharge, or scrotal pain. Pt notes he is a diabetic and on metformin/insulin. Also states that he has a history of prostate cancer and after removal of his prostate developed fistulas. He states that he has a perianal fistula that is supposed be repaired tomorrow at [facility]. He states that he has some seton drains in place at this time. He states that the perianal fistulas are not new. He states that this right lower quadrant pain is new. ROS   Pertinent positives and negatives are stated within HPI, all other systems reviewed and are negative. Past Medical History:  has a past medical history of Cancer ([address]), Difficult intubation, Grave's disease, Hyperlipidemia, Hypertension, Hypothyroidism (acquired), Prostate cancer ([address]), and Type 1 diabetes mellitus without complication ([address]). Surgical History:  has a past surgical history that includes Tonsillectomy; Abdomen surgery (); eye surgery ();  Retinal Normal turgor. Warm, dry, without visible rash, unless noted elsewhere. Neurological:  Orientation age-appropriate. Motor functions intact.     Lab / Imaging Results   (All laboratory and radiology results have been personally reviewed by myself)  Labs:  Results for orders placed or performed during the hospital encounter of 10/24/23   CBC with Auto Differential   Result Value Ref Range    WBC 22.8 (H) 4.5 - 11.5 k/uL    RBC 4.72 3.80 - 5.80 m/uL    Hemoglobin 13.7 12.5 - 16.5 g/dL    Hematocrit 40.9 37.0 - 54.0 %    MCV 86.7 80.0 - 99.9 fL    MCH 29.0 26.0 - 35.0 pg    MCHC 33.5 32.0 - 34.5 g/dL    RDW 12.7 11.5 - 15.0 %    Platelets 073 007 - 719 k/uL    MPV 11.5 7.0 - 12.0 fL    Neutrophils % 87 (H) 43.0 - 80.0 %    Lymphocytes % 4 (L) 20.0 - 42.0 %    Monocytes % 9 2.0 - 12.0 %    Eosinophils % 0 0 - 6 %    Basophils % 0 0.0 - 2.0 %    Immature Granulocytes 1 0.0 - 5.0 %    Neutrophils Absolute 19.76 (H) 1.80 - 7.30 k/uL    Lymphocytes Absolute 0.79 (L) 1.50 - 4.00 k/uL    Monocytes Absolute 2.01 (H) 0.10 - 0.95 k/uL    Eosinophils Absolute 0.00 (L) 0.05 - 0.50 k/uL    Basophils Absolute 0.02 0.00 - 0.20 k/uL    Absolute Immature Granulocyte 0.20 0.00 - 0.58 k/uL    RBC Morphology Normal    Lactic Acid   Result Value Ref Range    Lactic Acid 2.0 0.5 - 2.2 mmol/L   Lipase   Result Value Ref Range    Lipase 51 13 - 60 U/L   Urinalysis   Result Value Ref Range    Color, UA Yellow Yellow    Turbidity UA Clear Clear    Glucose, Ur 500 (A) NEGATIVE mg/dL    Bilirubin Urine SMALL (A) NEGATIVE    Ketones, Urine 15 (A) NEGATIVE mg/dL    Specific Gravity, UA 1.025 1.005 - 1.030    Urine Hgb MODERATE (A) NEGATIVE    pH, UA 6.0 5.0 - 9.0    Protein, UA NEGATIVE NEGATIVE mg/dL    Urobilinogen, Urine 0.2 0.0 - 1.0 EU/dL    Nitrite, Urine NEGATIVE NEGATIVE    Leukocyte Esterase, Urine NEGATIVE NEGATIVE   Comprehensive Metabolic Panel w/ Reflex to MG   Result Value Ref Range    Sodium 124 (L) 132 - 146 mmol/L    Potassium 5.1

## 2023-10-25 NOTE — CARE COORDINATION
Introduced my self and provided explanation of CM role to patient. Patient is awake, alert, and aware of current diagnosis and discharge planning. Patient is from home with his wife independently. Patient has a glucometer at home. PCP is Dr. Naun David. Preferred pharmacy is [facility] in [facility]. Patient states his discharge plan is home with no needs. POD #1 for Lap Appi. General surgery following, await plan. Patient will need followed and assisted with discharge planning as necessary.      Electronically signed by Anh Rolon RN on 10/25/2023 at 1:54 PM

## 2023-10-25 NOTE — OP NOTE
Operative Note      Patient: Fred Bowens  YOB: 1960  MRN: [id number]    Date of Procedure: 10/25/2023    Pre-Op Diagnosis Codes:     * Acute appendicitis, unspecified acute appendicitis type [K35.80]    Post-Op Diagnosis: Same perforated appendicitis       Procedure(s):  APPENDECTOMY LAPAROSCOPIC    Surgeon(s):  Anthony Fabian DO    Assistant:   Surgical Assistant: Carmen Rose RN  First Assistant: Tom Ratliff    Anesthesia: General    Estimated Blood Loss (mL): Minimal    Complications: None    Specimens:   ID Type Source Tests Collected by Time Destination   A : APPENDIX Tissue Appendix SURGICAL PATHOLOGY Anthony Fabian DO 10/25/2023 0622        Implants:  * No implants in log *      Drains:   [REMOVED] Urinary Catheter 11/12/12 Latex (Removed)       Findings: perforated appendicitis with feculent peritonitis        Detailed Description of Procedure:     PROCEDURE: The patient was brought to the operating room and positioned supine on the OR table. Sequential compression devices were placed on the patient's lower extremities and functioning. Preoperative antibiotics were administered. Anesthesia was obtained without complication as per the anesthesia record. Immediately prior to the procedure a time-out was called and the surgical checklist was reviewed and agreed upon by all present. The patient was prepped and draped in the usual sterile fashion and the procedure went forth with strict aseptic technique under maximal barrier precautions. Using a 11 blade, a stab incision was made in the LUQ. The Veress needle was inserted through the incision into the peritoneal cavity and placement of the Veress needle was confirmed with a saline drop test. The abdomen was insufflated to [id number] and a supraumbilical incision was made and a 5 mm Optiview trocar was inserted into the peritoneal cavity under direct visualization.  Next a 10 mm port was placed in the left lower quadrant and a 5 mm

## 2023-10-25 NOTE — ACP (ADVANCE CARE PLANNING)
Advance Care Planning   Healthcare Decision Maker:    Primary Decision Maker: Braden Kelly - Spouse - [phone]    Click here to complete Healthcare Decision Makers including selection of the Healthcare Decision Maker Relationship (ie \"Primary\"). Today we documented Decision Maker(s) consistent with Legal Next of Kin hierarchy.

## 2023-10-25 NOTE — PROGRESS NOTES
4 Eyes Skin Assessment     NAME:  Kathy Laboy  YOB: 1960  MEDICAL RECORD NUMBER:  [id number]    The patient is being assessed for  Post-Op Surgical    I agree that at least one RN has performed a thorough Head to Toe Skin Assessment on the patient. ALL assessment sites listed below have been assessed. Areas assessed by both nurses:    Head, Face, Ears, Shoulders, Back, Chest, Arms, Elbows, Hands, Sacrum. Buttock, Coccyx, Ischium, Legs. Feet and Heels, and Under Medical Devices         Does the Patient have a Wound?  No noted wound(s)       Bud Prevention initiated by RN: No  Wound Care Orders initiated by RN: No    Pressure Injury (Stage 3,4, Unstageable, DTI, NWPT, and Complex wounds) if present, place Wound referral order by RN under : No    New Ostomies, if present place, Ostomy referral order under : No     Nurse 1 eSignature: Electronically signed by Charley Bourgeois RN on 10/25/23 at 4:55 PM EDT    **SHARE this note so that the co-signing nurse can place an eSignature**    Nurse 2 eSignature: Electronically signed by Leanna Middleton RN on 10/25/23 at 4:56 PM EDT

## 2023-10-25 NOTE — PROGRESS NOTES
Dr. Alyssa Souza, DO,    Your patient is on a medication that requires a weight dose adjustment. Body Weight Function Assessment:    Date Body Weight IBW  Adjusted BW SCr  CrCl Dialysis status   10/24/2023 102.1 kg (225 lb)  Ideal body weight: 73 kg (160 lb 15 oz)  Adjusted ideal body weight: 84.6 kg (186 lb 9 oz) Serum creatinine: 1.6 mg/dL (H) 10/24/23 2000  Estimated creatinine clearance: 57 mL/min (A) N/A       Pharmacy has weight dose-adjusted the following medication(s):    Date Previous Order Adjusted Order   10/24/2023 Lovenox 30 mg Sub-Q daily Lovenox 30 mg Sub-Q bid       These changes were made per protocol according to the [facility] Weight Based Dose Adjustment Policy. *Please note this dose may need readjusted if your patient's condition changes. Please contact pharmacy with any questions regarding these changes.     Zulma Davenport, [facility]  10/24/2023  11:42 PM

## 2023-10-25 NOTE — H&P
1/26/21   Tone Soto MD   insulin glargine (LANTUS;BASAGLAR) 100 UNIT/ML injection pen Inject 30 Units into the skin daily 1/26/21   Tone Soto MD   oxyCODONE-acetaminophen (PERCOCET)  MG per tablet Take 1 tablet by mouth every 4 hours as needed for Pain. .    Tobias Marquis MD   Probiotic Product (PROBIOTIC ADVANCED) CAPS Take by mouth    Tobias Marquis MD   valsartan-hydrochlorothiazide (DIOVAN-HCT) 320-25 MG per tablet Take 1 tablet by mouth daily 1/30/19   Tone Soto MD   Melatonin 10 MG CAPS Take by mouth    Tobias Marquis MD   ACCU-CHEK FASTCLIX LANCETS MISC by Other route    Tobias Marquis MD   bimatoprost (LUMIGAN) 0.01 % SOLN ophthalmic drops 1 Drop daily at bedtime. Tobias Marquis MD   KRILL OIL PO Take 600 mg by mouth    Tobias Marquis MD   TURMERIC PO Take 1,800 mg by mouth daily    Tobias Marquis MD   APPLE CIDER VINEGAR PO Take by mouth daily    Tobias Marquis MD   Pramoxine-HC (HYDROCORTISONE-PRAMOXINE) 2.5-1 % CREA Apply topically    Tobias Marquis MD   multivitamin (THERAGRAN) per tablet Take 1 tablet by mouth daily. Tobias Marquis MD   Ascorbic Acid (VITAMIN C) 1000 MG tablet Take 1,000 mg by mouth daily. Tobias Marquis MD   Cholecalciferol (VITAMIN D-3) 5000 UNITS TABS Take  by mouth. Tobias Marquis MD   Coenzyme Q10 (COQ-10) 400 MG CAPS Take  by mouth. Tobias Marquis MD   Psyllium (METAMUCIL PO) Take  by mouth. Tobias Marquis MD       Allergies   Allergen Reactions    Metronidazole      Other reaction(s): GI Upset    Norco [Hydrocodone-Acetaminophen] Itching    Other      Other reaction(s):  Other: See Comments  Runny nose, itchy eyes       Family History   Problem Relation Age of Onset    Stroke Mother     Other Mother         leukemia    Liver Cancer Father     Thyroid Disease Daughter         hypothyroidism       Social History     Tobacco Use CHEST: Visualized lung bases are clear. LIVER: Unremarkable. BILIARY: Gallbladder is without calcified stone. No significant biliary dilatation. SPLEEN: Unremarkable. PANCREAS: Grossly unremarkable. ADRENALS: Unremarkable. KIDNEYS: No hydronephrosis or obstructive calculi. Bilateral perinephric stranding, nonspecific. GI: No obstruction. Appendix is distended, there is an appendicolith at its base, and there is significant surrounding inflammatory change and some fluid. No pneumoperitoneum. LYMPH NODES: No significant lymphadenopathy. VESSELS: Abdominal aorta is normal in caliber. Mild atherosclerotic calcification. PELVIS: Bladder appears unremarkable. Suspected perianal fistula with seton loop. BONES: No aggressive osseous lesion. ADDITIONAL FINDINGS: None. There are findings compatible with acute appendicitis. Note that there is marked periappendiceal inflammation. Results communication process initiated at the time of dictation. ASSESSMENT/PLAN:  58 y.o. male with acute appendicitis.    -NPO, IVF, IV abx  -pain and nausea control  -plan for laparoscopic appendectomy  -procedure consent placed  -risks and benefits of procedure discussed with patient    Plan will be discussed with Dr. Cherrie Mak.     Jenny Lenz DO  Resident, PGY-1  10/25/2023  4:08 AM

## 2023-10-26 VITALS
HEART RATE: 96 BPM | HEIGHT: 70 IN | RESPIRATION RATE: 18 BRPM | WEIGHT: 245 LBS | TEMPERATURE: 98 F | BODY MASS INDEX: 35.07 KG/M2 | SYSTOLIC BLOOD PRESSURE: 166 MMHG | DIASTOLIC BLOOD PRESSURE: 77 MMHG | OXYGEN SATURATION: 98 %

## 2023-10-26 LAB
ALBUMIN SERPL-MCNC: 3.2 G/DL (ref 3.5–5.2)
ALP SERPL-CCNC: 52 U/L (ref 40–129)
ALT SERPL-CCNC: 11 U/L (ref 0–40)
ANION GAP SERPL CALCULATED.3IONS-SCNC: 11 MMOL/L (ref 7–16)
AST SERPL-CCNC: 9 U/L (ref 0–39)
BASOPHILS # BLD: 0.02 K/UL (ref 0–0.2)
BASOPHILS NFR BLD: 0 % (ref 0–2)
BILIRUB SERPL-MCNC: 0.3 MG/DL (ref 0–1.2)
BUN SERPL-MCNC: 25 MG/DL (ref 6–23)
CALCIUM SERPL-MCNC: 8.7 MG/DL (ref 8.6–10.2)
CHLORIDE SERPL-SCNC: 93 MMOL/L (ref 98–107)
CO2 SERPL-SCNC: 24 MMOL/L (ref 22–29)
CREAT SERPL-MCNC: 1.7 MG/DL (ref 0.7–1.2)
EOSINOPHIL # BLD: 0 K/UL (ref 0.05–0.5)
EOSINOPHILS RELATIVE PERCENT: 0 % (ref 0–6)
ERYTHROCYTE [DISTWIDTH] IN BLOOD BY AUTOMATED COUNT: 12.9 % (ref 11.5–15)
GFR SERPL CREATININE-BSD FRML MDRD: 45 ML/MIN/1.73M2
GLUCOSE BLD-MCNC: 263 MG/DL (ref 74–99)
GLUCOSE BLD-MCNC: 312 MG/DL (ref 74–99)
GLUCOSE SERPL-MCNC: 319 MG/DL (ref 74–99)
HBA1C MFR BLD: 7.3 % (ref 4–5.6)
HCT VFR BLD AUTO: 33.9 % (ref 37–54)
HGB BLD-MCNC: 11.3 G/DL (ref 12.5–16.5)
IMM GRANULOCYTES # BLD AUTO: 0.18 K/UL (ref 0–0.58)
IMM GRANULOCYTES NFR BLD: 1 % (ref 0–5)
LYMPHOCYTES NFR BLD: 0.77 K/UL (ref 1.5–4)
LYMPHOCYTES RELATIVE PERCENT: 4 % (ref 20–42)
MCH RBC QN AUTO: 28.9 PG (ref 26–35)
MCHC RBC AUTO-ENTMCNC: 33.3 G/DL (ref 32–34.5)
MCV RBC AUTO: 86.7 FL (ref 80–99.9)
MONOCYTES NFR BLD: 1.48 K/UL (ref 0.1–0.95)
MONOCYTES NFR BLD: 8 % (ref 2–12)
NEUTROPHILS NFR BLD: 86 % (ref 43–80)
NEUTS SEG NFR BLD: 15.13 K/UL (ref 1.8–7.3)
PLATELET # BLD AUTO: 226 K/UL (ref 130–450)
PMV BLD AUTO: 12 FL (ref 7–12)
POTASSIUM SERPL-SCNC: 4.3 MMOL/L (ref 3.5–5)
PROT SERPL-MCNC: 6.7 G/DL (ref 6.4–8.3)
RBC # BLD AUTO: 3.91 M/UL (ref 3.8–5.8)
SODIUM SERPL-SCNC: 128 MMOL/L (ref 132–146)
WBC OTHER # BLD: 17.6 K/UL (ref 4.5–11.5)

## 2023-10-26 PROCEDURE — 85025 COMPLETE CBC W/AUTO DIFF WBC: CPT

## 2023-10-26 PROCEDURE — 82962 GLUCOSE BLOOD TEST: CPT

## 2023-10-26 PROCEDURE — 6360000002 HC RX W HCPCS

## 2023-10-26 PROCEDURE — 6370000000 HC RX 637 (ALT 250 FOR IP): Performed by: INTERNAL MEDICINE

## 2023-10-26 PROCEDURE — 6370000000 HC RX 637 (ALT 250 FOR IP): Performed by: STUDENT IN AN ORGANIZED HEALTH CARE EDUCATION/TRAINING PROGRAM

## 2023-10-26 PROCEDURE — 6370000000 HC RX 637 (ALT 250 FOR IP)

## 2023-10-26 PROCEDURE — 80053 COMPREHEN METABOLIC PANEL: CPT

## 2023-10-26 PROCEDURE — 2580000003 HC RX 258: Performed by: STUDENT IN AN ORGANIZED HEALTH CARE EDUCATION/TRAINING PROGRAM

## 2023-10-26 PROCEDURE — 2580000003 HC RX 258

## 2023-10-26 PROCEDURE — 83036 HEMOGLOBIN GLYCOSYLATED A1C: CPT

## 2023-10-26 RX ORDER — OXYCODONE HYDROCHLORIDE 5 MG/1
5 TABLET ORAL EVERY 6 HOURS PRN
Qty: 21 TABLET | Refills: 0 | Status: SHIPPED | OUTPATIENT
Start: 2023-10-26 | End: 2023-10-31

## 2023-10-26 RX ORDER — MORPHINE SULFATE 2 MG/ML
2 INJECTION, SOLUTION INTRAMUSCULAR; INTRAVENOUS
Status: DISCONTINUED | OUTPATIENT
Start: 2023-10-26 | End: 2023-10-26 | Stop reason: HOSPADM

## 2023-10-26 RX ORDER — METHOCARBAMOL 750 MG/1
750 TABLET, FILM COATED ORAL 4 TIMES DAILY
Qty: 40 TABLET | Refills: 0 | Status: SHIPPED | OUTPATIENT
Start: 2023-10-26 | End: 2023-11-05

## 2023-10-26 RX ORDER — METHOCARBAMOL 750 MG/1
750 TABLET, FILM COATED ORAL 4 TIMES DAILY
Qty: 40 TABLET | Refills: 0 | Status: SHIPPED | OUTPATIENT
Start: 2023-10-26 | End: 2023-10-26 | Stop reason: SDUPTHER

## 2023-10-26 RX ORDER — SENNA AND DOCUSATE SODIUM 50; 8.6 MG/1; MG/1
2 TABLET, FILM COATED ORAL DAILY
Status: DISCONTINUED | OUTPATIENT
Start: 2023-10-26 | End: 2023-10-26 | Stop reason: HOSPADM

## 2023-10-26 RX ORDER — AMOXICILLIN AND CLAVULANATE POTASSIUM 875; 125 MG/1; MG/1
1 TABLET, FILM COATED ORAL 2 TIMES DAILY
Qty: 14 TABLET | Refills: 0 | Status: SHIPPED | OUTPATIENT
Start: 2023-10-26 | End: 2023-11-02

## 2023-10-26 RX ADMIN — DOCUSATE SODIUM 50 MG AND SENNOSIDES 8.6 MG 2 TABLET: 8.6; 5 TABLET, FILM COATED ORAL at 09:25

## 2023-10-26 RX ADMIN — LEVOTHYROXINE SODIUM 125 MCG: 25 TABLET ORAL at 06:47

## 2023-10-26 RX ADMIN — INSULIN GLARGINE 35 UNITS: 100 INJECTION, SOLUTION SUBCUTANEOUS at 09:24

## 2023-10-26 RX ADMIN — OXYCODONE HYDROCHLORIDE 10 MG: 5 TABLET ORAL at 09:25

## 2023-10-26 RX ADMIN — POLYETHYLENE GLYCOL 3350 17 G: 17 POWDER, FOR SOLUTION ORAL at 09:26

## 2023-10-26 RX ADMIN — PIPERACILLIN AND TAZOBACTAM 3375 MG: 3; .375 INJECTION, POWDER, LYOPHILIZED, FOR SOLUTION INTRAVENOUS at 06:45

## 2023-10-26 RX ADMIN — OXYCODONE HYDROCHLORIDE 10 MG: 5 TABLET ORAL at 00:00

## 2023-10-26 RX ADMIN — OXYCODONE HYDROCHLORIDE 10 MG: 5 TABLET ORAL at 04:08

## 2023-10-26 RX ADMIN — SODIUM CHLORIDE: 9 INJECTION, SOLUTION INTRAVENOUS at 06:44

## 2023-10-26 RX ADMIN — INSULIN LISPRO 6 UNITS: 100 INJECTION, SOLUTION INTRAVENOUS; SUBCUTANEOUS at 06:50

## 2023-10-26 ASSESSMENT — PAIN - FUNCTIONAL ASSESSMENT
PAIN_FUNCTIONAL_ASSESSMENT: ACTIVITIES ARE NOT PREVENTED
PAIN_FUNCTIONAL_ASSESSMENT: ACTIVITIES ARE NOT PREVENTED

## 2023-10-26 ASSESSMENT — PAIN DESCRIPTION - DESCRIPTORS
DESCRIPTORS: DISCOMFORT
DESCRIPTORS: DISCOMFORT;TENDER
DESCRIPTORS: ACHING;TENDER;SORE

## 2023-10-26 ASSESSMENT — PAIN DESCRIPTION - LOCATION
LOCATION: ABDOMEN

## 2023-10-26 ASSESSMENT — PAIN SCALES - GENERAL
PAINLEVEL_OUTOF10: 7
PAINLEVEL_OUTOF10: 7
PAINLEVEL_OUTOF10: 10

## 2023-10-26 ASSESSMENT — PAIN DESCRIPTION - ORIENTATION: ORIENTATION: OTHER (COMMENT)

## 2023-10-26 NOTE — PATIENT CARE CONFERENCE
[facility] Quality Flow/Interdisciplinary Rounds Progress Note        Quality Flow Rounds held on October 26, 2023    Disciplines Attending:  Bedside Nurse, , , and Nursing Unit Leadership    Jesus Manuel Delgadillo was admitted on 10/24/2023  7:41 PM    Anticipated Discharge Date:       Disposition:    Bud Score:  Bud Scale Score: 22    Readmission Risk              Risk of Unplanned Readmission:  15           Discussed patient goal for the day, patient clinical progression, and barriers to discharge.   The following Goal(s) of the Day/Commitment(s) have been identified:  Discharge - Obtain Order possible DC later today      Darnell Mcdaniel RN  October 26, 2023

## 2023-10-26 NOTE — PROGRESS NOTES
Pt and his wife both stated that they do not want the oxycodone script filled. Pt states he has percocet at home that he takes.  Electronically signed by Alberto Jacobson RN on 10/26/2023 at 11:38 AM

## 2023-10-26 NOTE — DISCHARGE INSTRUCTIONS
Dr. Halie Graham Discharge Instuctions    Discharge Home. Call office to schedule follow-up appointment in 2 weeks    Diet: Advance your diet as tolerated    Activity: Increase activity gradually. No heavy lifting for 2 weeks. no driving while on prescription pain medication. Shower/Bathing: Okay to shower in 24 hours. No tub bathing, swimming or soaking for 2 weeks    Dressings/Splint: You have a clean dressing applied. The glue will fall off on its own     Medications: Take as prescribed.

## 2023-10-27 LAB — SURGICAL PATHOLOGY REPORT: NORMAL

## 2023-10-31 NOTE — ANESTHESIA POSTPROCEDURE EVALUATION
Department of Anesthesiology  Postprocedure Note    Patient: Kiki Bean  MRN: [id number]  [address] [facility] [place]: 1960  Date of evaluation: 10/31/2023      Procedure Summary     Date: 10/25/23 Room / Location: [facility] OR 01 / [facility]    Anesthesia Start: 9500 Anesthesia Stop: 0420    Procedure: APPENDECTOMY LAPAROSCOPIC (Abdomen) Diagnosis:       Acute appendicitis, unspecified acute appendicitis type      (Acute appendicitis, unspecified acute appendicitis type [K35.80])    Surgeons: Bob Spears DO Responsible Provider: Suzette Velázquez DO    Anesthesia Type: general ASA Status: 3 - Emergent          Anesthesia Type: No value filed.     Nadira Phase I: Nadira Score: 10    Nadira Phase II:        Anesthesia Post Evaluation    Patient location during evaluation: PACU  Patient participation: complete - patient participated  Level of consciousness: awake  Airway patency: patent  Nausea & Vomiting: no nausea and no vomiting  Complications: no  Cardiovascular status: hemodynamically stable  Respiratory status: acceptable  Hydration status: stable  Pain management: adequate

## 2023-11-10 ENCOUNTER — OFFICE VISIT (OUTPATIENT)
Dept: SURGERY | Age: 63
End: 2023-11-10

## 2023-11-10 VITALS
BODY MASS INDEX: 35.07 KG/M2 | HEIGHT: 70 IN | OXYGEN SATURATION: 98 % | SYSTOLIC BLOOD PRESSURE: 154 MMHG | HEART RATE: 98 BPM | DIASTOLIC BLOOD PRESSURE: 88 MMHG | WEIGHT: 245 LBS | TEMPERATURE: 97 F

## 2023-11-10 DIAGNOSIS — K35.32 PERFORATED APPENDICITIS: Primary | ICD-10-CM

## 2023-11-10 DIAGNOSIS — Z09 POSTOPERATIVE EXAMINATION: ICD-10-CM

## 2023-11-10 PROCEDURE — 99024 POSTOP FOLLOW-UP VISIT: CPT | Performed by: STUDENT IN AN ORGANIZED HEALTH CARE EDUCATION/TRAINING PROGRAM

## 2023-12-22 ENCOUNTER — ANESTHESIA (OUTPATIENT)
Dept: OPERATING ROOM | Facility: CLINIC | Age: 63
End: 2023-12-22
Payer: COMMERCIAL

## 2023-12-22 ENCOUNTER — HOSPITAL ENCOUNTER (OUTPATIENT)
Facility: CLINIC | Age: 63
Setting detail: OUTPATIENT SURGERY
Discharge: HOME | End: 2023-12-22
Attending: SURGERY | Admitting: SURGERY
Payer: COMMERCIAL

## 2023-12-22 ENCOUNTER — ANESTHESIA EVENT (OUTPATIENT)
Dept: OPERATING ROOM | Facility: CLINIC | Age: 63
End: 2023-12-22
Payer: COMMERCIAL

## 2023-12-22 VITALS
DIASTOLIC BLOOD PRESSURE: 66 MMHG | RESPIRATION RATE: 16 BRPM | WEIGHT: 224.87 LBS | HEART RATE: 79 BPM | BODY MASS INDEX: 32.19 KG/M2 | TEMPERATURE: 97.2 F | HEIGHT: 70 IN | OXYGEN SATURATION: 97 % | SYSTOLIC BLOOD PRESSURE: 133 MMHG

## 2023-12-22 DIAGNOSIS — K60.3 ANAL FISTULA: Primary | ICD-10-CM

## 2023-12-22 PROBLEM — I10 PRIMARY HYPERTENSION: Status: ACTIVE | Noted: 2023-12-22

## 2023-12-22 PROBLEM — E05.90 HYPERTHYROIDISM: Status: ACTIVE | Noted: 2023-12-22

## 2023-12-22 PROBLEM — E11.39 TYPE 2 DIABETES MELLITUS WITH OPHTHALMIC COMPLICATION, WITH LONG-TERM CURRENT USE OF INSULIN (MULTI): Status: ACTIVE | Noted: 2023-12-22

## 2023-12-22 PROBLEM — Z79.4 TYPE 2 DIABETES MELLITUS WITH OPHTHALMIC COMPLICATION, WITH LONG-TERM CURRENT USE OF INSULIN (MULTI): Status: ACTIVE | Noted: 2023-12-22

## 2023-12-22 LAB — POC FINGERSTICK BLOOD GLUCOSE: 168 MG/DL (ref 70–100)

## 2023-12-22 PROCEDURE — 3700000002 HC GENERAL ANESTHESIA TIME - EACH INCREMENTAL 1 MINUTE: Performed by: SURGERY

## 2023-12-22 PROCEDURE — 2500000005 HC RX 250 GENERAL PHARMACY W/O HCPCS

## 2023-12-22 PROCEDURE — 2500000005 HC RX 250 GENERAL PHARMACY W/O HCPCS: Performed by: SURGERY

## 2023-12-22 PROCEDURE — 3600000006 HC OR TIME - EACH INCREMENTAL 1 MINUTE - PROCEDURE LEVEL ONE: Performed by: SURGERY

## 2023-12-22 PROCEDURE — 46270 REMOVE ANAL FIST SUBQ: CPT | Performed by: SURGERY

## 2023-12-22 PROCEDURE — 7100000009 HC PHASE TWO TIME - INITIAL BASE CHARGE: Performed by: SURGERY

## 2023-12-22 PROCEDURE — 3600000001 HC OR TIME - INITIAL BASE CHARGE - PROCEDURE LEVEL ONE: Performed by: SURGERY

## 2023-12-22 PROCEDURE — 7100000002 HC RECOVERY ROOM TIME - EACH INCREMENTAL 1 MINUTE: Performed by: SURGERY

## 2023-12-22 PROCEDURE — 46020 PLACEMENT OF SETON: CPT | Performed by: SURGERY

## 2023-12-22 PROCEDURE — 7100000010 HC PHASE TWO TIME - EACH INCREMENTAL 1 MINUTE: Performed by: SURGERY

## 2023-12-22 PROCEDURE — 2500000004 HC RX 250 GENERAL PHARMACY W/ HCPCS (ALT 636 FOR OP/ED)

## 2023-12-22 PROCEDURE — 3700000001 HC GENERAL ANESTHESIA TIME - INITIAL BASE CHARGE: Performed by: SURGERY

## 2023-12-22 PROCEDURE — 7100000001 HC RECOVERY ROOM TIME - INITIAL BASE CHARGE: Performed by: SURGERY

## 2023-12-22 RX ORDER — MIDAZOLAM HYDROCHLORIDE 1 MG/ML
INJECTION, SOLUTION INTRAMUSCULAR; INTRAVENOUS AS NEEDED
Status: DISCONTINUED | OUTPATIENT
Start: 2023-12-22 | End: 2023-12-22

## 2023-12-22 RX ORDER — LIDOCAINE HYDROCHLORIDE 20 MG/ML
INJECTION, SOLUTION INFILTRATION; PERINEURAL AS NEEDED
Status: DISCONTINUED | OUTPATIENT
Start: 2023-12-22 | End: 2023-12-22

## 2023-12-22 RX ORDER — PROPOFOL 10 MG/ML
INJECTION, EMULSION INTRAVENOUS AS NEEDED
Status: DISCONTINUED | OUTPATIENT
Start: 2023-12-22 | End: 2023-12-22

## 2023-12-22 RX ORDER — ONDANSETRON HYDROCHLORIDE 2 MG/ML
INJECTION, SOLUTION INTRAVENOUS AS NEEDED
Status: DISCONTINUED | OUTPATIENT
Start: 2023-12-22 | End: 2023-12-22

## 2023-12-22 RX ORDER — BUPIVACAINE HCL/EPINEPHRINE 0.5-1:200K
VIAL (ML) INJECTION AS NEEDED
Status: DISCONTINUED | OUTPATIENT
Start: 2023-12-22 | End: 2023-12-22 | Stop reason: HOSPADM

## 2023-12-22 RX ADMIN — SODIUM CHLORIDE, SODIUM LACTATE, POTASSIUM CHLORIDE, AND CALCIUM CHLORIDE: .6; .31; .03; .02 INJECTION, SOLUTION INTRAVENOUS at 07:34

## 2023-12-22 RX ADMIN — PROPOFOL 150 MG: 10 INJECTION, EMULSION INTRAVENOUS at 07:40

## 2023-12-22 RX ADMIN — MIDAZOLAM 2 MG: 1 INJECTION INTRAMUSCULAR; INTRAVENOUS at 07:36

## 2023-12-22 RX ADMIN — ONDANSETRON 4 MG: 2 INJECTION INTRAMUSCULAR; INTRAVENOUS at 07:47

## 2023-12-22 RX ADMIN — LIDOCAINE HYDROCHLORIDE 100 MG: 20 INJECTION, SOLUTION INFILTRATION; PERINEURAL at 07:40

## 2023-12-22 SDOH — HEALTH STABILITY: MENTAL HEALTH: CURRENT SMOKER: 1

## 2023-12-22 ASSESSMENT — COLUMBIA-SUICIDE SEVERITY RATING SCALE - C-SSRS
2. HAVE YOU ACTUALLY HAD ANY THOUGHTS OF KILLING YOURSELF?: NO
1. IN THE PAST MONTH, HAVE YOU WISHED YOU WERE DEAD OR WISHED YOU COULD GO TO SLEEP AND NOT WAKE UP?: NO
6. HAVE YOU EVER DONE ANYTHING, STARTED TO DO ANYTHING, OR PREPARED TO DO ANYTHING TO END YOUR LIFE?: NO

## 2023-12-22 ASSESSMENT — ENCOUNTER SYMPTOMS
ANAL BLEEDING: 1
NAUSEA: 0
RECTAL PAIN: 1
DIARRHEA: 0
ABDOMINAL DISTENTION: 0
VOMITING: 0
ABDOMINAL PAIN: 0
CONSTIPATION: 0
BLOOD IN STOOL: 0

## 2023-12-22 ASSESSMENT — PAIN SCALES - GENERAL
PAINLEVEL_OUTOF10: 2
PAINLEVEL_OUTOF10: 1
PAINLEVEL_OUTOF10: 0 - NO PAIN
PAINLEVEL_OUTOF10: 0 - NO PAIN
PAIN_LEVEL: 0
PAINLEVEL_OUTOF10: 2

## 2023-12-22 ASSESSMENT — PAIN - FUNCTIONAL ASSESSMENT
PAIN_FUNCTIONAL_ASSESSMENT: 0-10

## 2023-12-22 NOTE — H&P
History Of Present Illness  Sawyer Jamison is a 63 y.o. male presenting with fistula.     Past Medical History  Past Medical History:   Diagnosis Date    Diabetes mellitus (CMS/HCC)     Glaucoma     Hypertension     Hypothyroidism     Joint pain     Lumbar disc disease     Personal history of malignant neoplasm of prostate     History of malignant neoplasm of prostate       Surgical History  Past Surgical History:   Procedure Laterality Date    OTHER SURGICAL HISTORY  05/17/2019    Eye surgery    OTHER SURGICAL HISTORY  05/17/2019    Knee surgery    OTHER SURGICAL HISTORY  05/17/2019    Colonoscopy    OTHER SURGICAL HISTORY  05/17/2019    Prostate surgery    OTHER SURGICAL HISTORY  05/17/2019    Hand surgery    OTHER SURGICAL HISTORY  05/17/2019    Shoulder surgery        Social History  He reports that he has been smoking cigarettes. He has never used smokeless tobacco. He reports current alcohol use of about 3.0 standard drinks of alcohol per week. He reports that he does not use drugs.    Family History  Family History   Problem Relation Name Age of Onset    Stroke Mother      Liver cancer Father          Allergies  Patient has no known allergies.    Review of Systems   Gastrointestinal:  Positive for anal bleeding and rectal pain. Negative for abdominal distention, abdominal pain, blood in stool, constipation, diarrhea, nausea and vomiting.   All other systems reviewed and are negative.       Physical Exam  Vitals reviewed.   HENT:      Head: Normocephalic.      Mouth/Throat:      Mouth: Mucous membranes are moist.   Eyes:      Extraocular Movements: Extraocular movements intact.   Cardiovascular:      Rate and Rhythm: Normal rate.   Pulmonary:      Effort: Pulmonary effort is normal.   Abdominal:      General: Abdomen is flat.      Palpations: Abdomen is soft.   Musculoskeletal:         General: Normal range of motion.      Cervical back: Normal range of motion.   Neurological:      General: No focal deficit  "present.      Mental Status: He is alert.   Psychiatric:         Mood and Affect: Mood normal.          Last Recorded Vitals  Blood pressure 126/66, pulse 75, temperature 36.1 °C (97 °F), temperature source Temporal, resp. rate 16, height 1.778 m (5' 10\"), weight 102 kg (224 lb 13.9 oz), SpO2 100 %.    Relevant Results             Assessment/Plan   Principal Problem:    Anal fistula  Active Problems:    Primary hypertension    Type 2 diabetes mellitus with ophthalmic complication, with long-term current use of insulin (CMS/[facility])    Hyperthyroidism      63M with chronic fistula in ano    OR for EUA and seton       I spent 15 minutes in the professional and overall care of this patient.      Rose Riggs MD    "

## 2023-12-22 NOTE — ANESTHESIA PROCEDURE NOTES
Airway  Date/Time: 12/22/2023 7:44 AM  Urgency: elective    Airway not difficult    Staffing  Performed: NIK   Authorized by: Tonja No DO    Performed by: NIK Mazariegos  Patient location during procedure: OR    Indications and Patient Condition  Indications for airway management: anesthesia  Spontaneous Ventilation: absent  Sedation level: deep  Preoxygenated: yes  Patient position: sniffing  MILS not maintained throughout  Mask difficulty assessment: 0 - not attempted  Planned trial extubation    Final Airway Details  Final airway type: supraglottic airway      Successful airway: Size 4     Number of attempts at approach: 1    Additional Comments  I gel size 4

## 2023-12-22 NOTE — ANESTHESIA POSTPROCEDURE EVALUATION
Patient: Sawyer Jamison    Procedure Summary       Date: 12/22/23 [facility]    Anesthesia Start: 0736 Anesthesia Stop: 0809    Procedure: Exam Under Anesthesia Anus/Rectum, SETON Diagnosis:       Anal fistula      (Anal fistula [K60.3])    Surgeons: Rose Riggs MD Responsible Provider: Tonja No DO    Anesthesia Type: general ASA Status: 3            Anesthesia Type: general    Vitals Value Taken Time   /67 12/22/23 0809   Temp 37 12/22/23 0809   Pulse 86 12/22/23 0809   Resp 15 12/22/23 0809   SpO2 100% 12/22/23 0809       Anesthesia Post Evaluation    Patient location during evaluation: PACU  Patient participation: complete - patient participated  Level of consciousness: awake  Pain score: 0  Pain management: adequate  Airway patency: patent  There was medical reason for not screening for obstructive sleep apnea and/or not using of two or more mitigation strategies.Cardiovascular status: acceptable, hemodynamically stable and blood pressure returned to baseline  Respiratory status: acceptable and room air  Hydration status: acceptable  Postoperative Nausea and Vomiting: none      There were no known notable events for this encounter.

## 2023-12-22 NOTE — OP NOTE
Exam Under Anesthesia Anus/Rectum, SETON Operative Note     Date: 2023  OR Location: [facility] OR    Name: Sawyer Jamison : 1960, Age: 63 y.o., MRN: [id number], Sex: male    Diagnosis  Pre-op Diagnosis     * Anal fistula [K60.3] Post-op Diagnosis     * Anal fistula [K60.3]     Procedures  Exam Under Anesthesia Anus/Rectum, SETON  60060 - CA ANRCT XM SURG REQ ANES GENERAL SPI/EDRL DX      Surgeons      * Rose Riggs - Primary    Resident/Fellow/Other Assistant:  Surgeon(s) and Role:    Procedure Summary  Anesthesia: Monitor Anesthesia Care  ASA: III  Anesthesia Staff: Anesthesiologist: Tonja No DO  C-AA: NIK Mazariegos  Estimated Blood Loss: 5 mL  Intra-op Medications:   Medication Name Total Dose   BUPivacaine-EPINEPHrine (Marcaine w/EPI) 0.5 %-1:200,000 injection 30 mL              Anesthesia Record               Intraprocedure I/O Totals          Intake    .00 mL    Total Intake 300 mL          Specimen: No specimens collected     Staff:   Circulator: Brigida Holly RN  Scrub Person: Vivi Lucia         Drains and/or Catheters: * None in log *      Findings: right superficial fistula, left superficial fistula conneting to existing tract    Indications: Sawyre Jamison is an 63 y.o. male who is having surgery for Anal fistula [K60.3].     The patient was seen in the preoperative area. The risks, benefits, complications, treatment options, non-operative alternatives, expected recovery and outcomes were discussed with the patient. The possibilities of reaction to medication, pulmonary aspiration, injury to surrounding structures, bleeding, recurrent infection, the need for additional procedures, failure to diagnose a condition, and creating a complication requiring transfusion or operation were discussed with the patient. The patient concurred with the proposed plan, giving informed consent.  The site of surgery was properly noted/marked if necessary per policy.  The patient has been actively warmed in preoperative area. Preoperative antibiotics are not indicated. Venous thrombosis prophylaxis are not indicated.    Procedure Details:     Informed consent was obtained including discussion of risks, benefits, and alternatives. The patient was brought to the operating room and placed supine. Sequential compression devices were placed. Huddle was completed in accordance with hospital procedures. Anesthesia was induced and LMA was placed. The patient was placed in lithotomy with all pressure points padded. The area was prepped and draped in the usual fashion. Time out was completed.     Perineal and digital rectal exam were performed. Pudendal nerve block was performed with 1/4% marcaine with epinephrine. Intersphincteric block  was also performed. A total of 30cc of local anesthesia was utilized.    The perineum was inspected. There was a right lateral abscess opening. A lacrimal probe was inserted. This tracked through the superficial tissues to the existing right lateral fistula. The tract was laid open with electrocautery. There was an additional opening on the left lateral side. This also tracked to the existing lateral seton. The tract was laid open with electrocautery. The seton was exchanged.     The patient was then returned to supine. Anesthesia was weaned and the LMA was removed. The patient was transferred to PACU awake and in stable conditions. Counts were reported correct at the end of the procedure. I was present and scrubbed throughout.      Complications:  None; patient tolerated the procedure well.    Disposition: PACU - hemodynamically stable.  Condition: stable         Attending Attestation: I was present and scrubbed for the entire procedure.    Rose Riggs  Phone Number: [phone]

## 2023-12-22 NOTE — ANESTHESIA PREPROCEDURE EVALUATION
Patient: Sawyer Jamison    Procedure Information       Date/Time: 12/22/23 0730    Procedure: Exam Under Anesthesia Anus/Rectum, SETON - Candy cane lithotomy    Location: [facility] OR  / [facility] OR    Surgeons: Rose Riggs MD            Relevant Problems   Anesthesia (within normal limits)      Cardiovascular   (+) Primary hypertension      Endocrine   (+) Hyperthyroidism   (+) Type 2 diabetes mellitus with ophthalmic complication, with long-term current use of insulin (CMS/HCC)      GI  Perianal fistula      /Renal  Prostate cancer s/p radical prostatectomy      Pulmonary (within normal limits)       Clinical information reviewed:   Tobacco  Allergies  Meds   Med Hx  Surg Hx   Fam Hx  Soc Hx        NPO Detail:  NPO/Void Status  Date of Last Liquid: 12/21/23  Time of Last Liquid: 1930  Date of Last Solid: 12/21/23  Time of Last Solid: 1930         Physical Exam    Airway  Mallampati: II  TM distance: >3 FB  Neck ROM: full     Cardiovascular    Dental    Pulmonary    Abdominal            Anesthesia Plan    ASA 3     general     The patient is a current smoker.  Patient was previously instructed to abstain from smoking on day of procedure.  Patient did not smoke on day of procedure.    Anesthetic plan and risks discussed with patient.  Use of blood products discussed with patient who.    Plan discussed with attending.

## 2023-12-22 NOTE — DISCHARGE INSTRUCTIONS
"Rose Riggs MD  [facility]  Office Phone: [phone]     POST-OPERATIVE INSTRUCTIONS FOR  AMBULATORY ANORECTAL SURGERY        Wound Care  -Take all the bandages off in the late afternoon or evening and take the first hot bath  -The bath water should be as warm as tolerable, without causing burns  -It is NOT necessary to add anything to the water (such as Epsom salt)  -Hot baths should be used at least 3-4 times each day AND especially after each bowel movement  -The \"packing\" (if present) should be removed from the anus during the first bath  -Expect some oozing or slight bleeding  -No bandages are necessary, but may be used as desired to absorb any drainage: plain gauze or maxi pads    Pain  -The anesthetic that was injected at the time of surgery should last 3-6 hours  -It is normal for the anal area to be very painful for several days, and especially after the initial bowel movements  -HOT BATHS PROVIDE THE BEST PAIN RELIEF, and should be used liberally  -The prescription you have at home is for a strong narcotic pain medication. Take 1-2 tabs every 4-6 hours as needed for pain  -Use the pain medication as necessary, but be aware that it will cause some degree of constipation  -If the pain is less severe, Advil (up to 600 mg every 4-6 hours) or Tylenol (up to 1000 mg every 8 hours) should be substituted. For example: 8am Tylenol, 12pm Advil, 4pm Tylenol, 8pm Advil, 12am Tylenol. Do not take Percocet AND Tylenol together at the same time. You may take Percocet and advil.     Diet  -Eat as much fiber as possible: fruits, vegetables, salads, whole grain breads, bran cereals, bran muffins, prunes and prune juice  -Drink lots of fluids: water and juices  -Avoid spicy foods until the wounds are healed        Bowel Movements  -It is common not to move your bowels for 2 or 3 days after surgery  -It is important to try to avoid becoming constipated  -A high fiber diet is " essential  -Take a stool softener such as Metamucil once or twice a day, and/or Colace three times a day  -Excessive pressure in the rectum (or even pain) may indicate the need to have a bowel movement  -If a laxative is required usually try two tablespoons of Milk of Magnesia; if this is not effective, take Miralax  -THE FIRST BOWEL MOVEMENT HURTS!!!    Activity  -You may resume normal activities, including exercising, as soon as you feel up to it  -You can return to work whenever you feel able  -There is no way you can do yourself any harm or affect the success of the surgery by excessive activity    Things To Watch For  -A small amount of bleeding or oozing is normal, especially with bowel movements; if bleeding is heavy, or does not stop after bowel movements, call me immediately.  -It is not unusual to have difficulty urinating after surgery; often it is necessary it urinate while sitting in the hot bath; frequent urination of very small volumes is abnormal and requires that you call me. If you have not urinated at all by the first evening, you must call.    Follow-up Information  -If you do not already have an appointment, call my office within a few days, and make an appointment for a check-up about 6 weeks after your surgery.   -If there are questions or problems either I or one of my partners can be reached 24 hours a day, seven days a week. Call the office and the service will take a message and arrange a call back.

## (undated) DEVICE — SYRINGE 20ML LL S/C 50

## (undated) DEVICE — TROCAR: Brand: KII FIOS FIRST ENTRY

## (undated) DEVICE — BLADE CLIPPER GEN PURP NS

## (undated) DEVICE — PACK PROCEDURE SURG GEN CUST

## (undated) DEVICE — LIQUIBAND RAPID ADHESIVE 36/CS 0.8ML: Brand: MEDLINE

## (undated) DEVICE — Device

## (undated) DEVICE — CATHETER, IV, ANGIOCATH, 18 G X 1.88 IN, FEP POLYMER

## (undated) DEVICE — GOWN,SIRUS,FABRNF,XL,20/CS: Brand: MEDLINE

## (undated) DEVICE — SUTURE, SILK, 0 PERMA HAND, BR, SUTUPAK, 30IN, BLACK

## (undated) DEVICE — DRAPE,CHEST,FENES,15X10,STERIL: Brand: MEDLINE

## (undated) DEVICE — ANCHOR TISSUE RETRIEVAL SYSTEM, BAG SIZE 125 ML, PORT SIZE 8 MM: Brand: ANCHOR TISSUE RETRIEVAL SYSTEM

## (undated) DEVICE — COVER HANDLE LIGHT, STERIS, BLUE, STERILE

## (undated) DEVICE — PUMP SUC IRR TBNG L10FT W/ HNDPC ASSEMB STRYKEFLOW 2

## (undated) DEVICE — GLOVE SURG SZ 7 CRM LTX FREE POLYISOPRENE POLYMER BEAD ANTI

## (undated) DEVICE — SEALER ENDOSCP L37CM NANO COAT BLNT TIP LAP DIV

## (undated) DEVICE — SOLUTION IRRIG 1000ML 0.9% SOD CHL USP POUR PLAS BTL

## (undated) DEVICE — 6 X 9  1.75MIL 4-WALL LABGUARD: Brand: MINIGRIP COMMERCIAL LLC

## (undated) DEVICE — KIT,ANTI FOG,W/SPONGE & FLUID,SOFT PACK: Brand: MEDLINE

## (undated) DEVICE — APPLICATOR MEDICATED 26 CC SOLUTION HI LT ORNG CHLORAPREP

## (undated) DEVICE — TROCAR: Brand: KII® SLEEVE

## (undated) DEVICE — BRIEF, PANTY, MESH, XL, 2PK

## (undated) DEVICE — ELECTRODE PT RET AD L9FT HI MOIST COND ADH HYDRGEL CORDED

## (undated) DEVICE — SUTURE, SOFSILK, 0, 24 IN, MULTIPACK, BLACK

## (undated) DEVICE — ANCHOR TISSUE RETRIEVAL SYSTEM, BAG SIZE 175 ML, PORT SIZE 10 MM: Brand: ANCHOR TISSUE RETRIEVAL SYSTEM

## (undated) DEVICE — DOUBLE BASIN SET: Brand: MEDLINE INDUSTRIES, INC.

## (undated) DEVICE — BLADE,STAINLESS-STEEL,11,STRL,DISPOSABLE: Brand: MEDLINE

## (undated) DEVICE — WARMER SCP 2 ANTIFOG LAP DISP

## (undated) DEVICE — NEEDLE CLOSURE OMNICLOSE

## (undated) DEVICE — TOWEL,OR,DSP,ST,BLUE,STD,6/PK,12PK/CS: Brand: MEDLINE

## (undated) DEVICE — INSUFFLATION TUBING SET WITH FILTER, FUNNEL CONNECTOR AND LUER LOCK: Brand: JOSNOE MEDICAL INC

## (undated) DEVICE — DRESSING, ABDOMINAL, WET PRUF, TENDERSORB, 5 X 9 IN, STERILE

## (undated) DEVICE — NEEDLE HYPO 25GA L1.5IN BLU POLYPR HUB S STL REG BVL STR

## (undated) DEVICE — TIP, SUCTION, YANKAUER, FLEXIBLE

## (undated) DEVICE — RELOAD STPL SZ 0 L45MM DIA3.5MM 0DEG STD REG TISS BLU TI

## (undated) DEVICE — GLOVE, SURGICAL, PROTEXIS PI , 6.5, PF, LF

## (undated) DEVICE — CUTTER ENDOSCP L340MM LIN ARTC SGL STROKE FIRING ENDOPATH

## (undated) DEVICE — INSUFFLATION NEEDLE TO ESTABLISH PNEUMOPERITONEUM.: Brand: INSUFFLATION NEEDLE

## (undated) DEVICE — SYRINGE, 10 CC, LUER LOCK

## (undated) DEVICE — SPONGE, LAP, XRAY DECT, 18IN X 18IN, W/MASTER DMT, STERILE